# Patient Record
Sex: FEMALE | Race: WHITE | Employment: OTHER | URBAN - METROPOLITAN AREA
[De-identification: names, ages, dates, MRNs, and addresses within clinical notes are randomized per-mention and may not be internally consistent; named-entity substitution may affect disease eponyms.]

---

## 2018-04-04 ENCOUNTER — HOSPITAL ENCOUNTER (OUTPATIENT)
Age: 83
Discharge: HOME | End: 2018-04-06
Payer: COMMERCIAL

## 2018-04-04 ENCOUNTER — OFFICE VISIT (OUTPATIENT)
Dept: FAMILY MEDICINE CLINIC | Age: 83
End: 2018-04-04
Payer: COMMERCIAL

## 2018-04-04 VITALS
DIASTOLIC BLOOD PRESSURE: 82 MMHG | TEMPERATURE: 97.9 F | WEIGHT: 189 LBS | SYSTOLIC BLOOD PRESSURE: 132 MMHG | HEART RATE: 70 BPM | OXYGEN SATURATION: 98 % | BODY MASS INDEX: 33.48 KG/M2 | RESPIRATION RATE: 18 BRPM

## 2018-04-04 DIAGNOSIS — R35.0 URINE FREQUENCY: ICD-10-CM

## 2018-04-04 DIAGNOSIS — N30.00 ACUTE CYSTITIS WITHOUT HEMATURIA: Primary | ICD-10-CM

## 2018-04-04 LAB
BILIRUBIN, POC: NORMAL
BLOOD URINE, POC: NORMAL
CLARITY, POC: NORMAL
COLOR, POC: YELLOW
GLUCOSE URINE, POC: NORMAL
KETONES, POC: NORMAL
LEUKOCYTE EST, POC: NORMAL
NITRITE, POC: NORMAL
PH, POC: 6
PROTEIN, POC: NORMAL
SPECIFIC GRAVITY, POC: 1.02
UROBILINOGEN, POC: NORMAL

## 2018-04-04 PROCEDURE — 81002 URINALYSIS NONAUTO W/O SCOPE: CPT | Performed by: PHYSICIAN ASSISTANT

## 2018-04-04 PROCEDURE — 99213 OFFICE O/P EST LOW 20 MIN: CPT | Performed by: PHYSICIAN ASSISTANT

## 2018-04-04 PROCEDURE — 87088 URINE BACTERIA CULTURE: CPT

## 2018-04-04 PROCEDURE — 87077 CULTURE AEROBIC IDENTIFY: CPT

## 2018-04-04 PROCEDURE — 87186 SC STD MICRODIL/AGAR DIL: CPT

## 2018-04-04 RX ORDER — SULFAMETHOXAZOLE AND TRIMETHOPRIM 800; 160 MG/1; MG/1
1 TABLET ORAL 2 TIMES DAILY
Qty: 20 TABLET | Refills: 0 | Status: SHIPPED | OUTPATIENT
Start: 2018-04-04 | End: 2018-04-14

## 2018-04-07 LAB
ORGANISM: ABNORMAL
URINE CULTURE, ROUTINE: ABNORMAL
URINE CULTURE, ROUTINE: ABNORMAL

## 2018-04-11 DIAGNOSIS — N39.0 CHRONIC UTI: Primary | ICD-10-CM

## 2018-12-12 ENCOUNTER — HOSPITAL ENCOUNTER (EMERGENCY)
Age: 83
Discharge: HOME | End: 2018-12-12
Payer: COMMERCIAL

## 2018-12-12 VITALS
WEIGHT: 172 LBS | SYSTOLIC BLOOD PRESSURE: 129 MMHG | RESPIRATION RATE: 16 BRPM | BODY MASS INDEX: 30.47 KG/M2 | HEART RATE: 82 BPM | TEMPERATURE: 98.6 F | OXYGEN SATURATION: 96 % | DIASTOLIC BLOOD PRESSURE: 76 MMHG

## 2018-12-12 DIAGNOSIS — N39.0 URINARY TRACT INFECTION WITHOUT HEMATURIA, SITE UNSPECIFIED: Primary | ICD-10-CM

## 2018-12-12 LAB
BACTERIA: ABNORMAL /HPF
BILIRUBIN URINE: NEGATIVE
BLOOD, URINE: NEGATIVE
CLARITY: ABNORMAL
COLOR: YELLOW
EPITHELIAL CELLS, UA: ABNORMAL /HPF
GLUCOSE URINE: NEGATIVE MG/DL
KETONES, URINE: ABNORMAL MG/DL
LEUKOCYTE ESTERASE, URINE: ABNORMAL
NITRITE, URINE: NEGATIVE
PH UA: 7 (ref 5–9)
PROTEIN UA: NEGATIVE MG/DL
RBC UA: ABNORMAL /HPF (ref 0–2)
SPECIFIC GRAVITY UA: 1.02 (ref 1–1.03)
UROBILINOGEN, URINE: 0.2 E.U./DL
WBC UA: ABNORMAL /HPF (ref 0–5)

## 2018-12-12 PROCEDURE — 87088 URINE BACTERIA CULTURE: CPT

## 2018-12-12 PROCEDURE — 87186 SC STD MICRODIL/AGAR DIL: CPT

## 2018-12-12 PROCEDURE — 99212 OFFICE O/P EST SF 10 MIN: CPT

## 2018-12-12 PROCEDURE — 87077 CULTURE AEROBIC IDENTIFY: CPT

## 2018-12-12 PROCEDURE — 81001 URINALYSIS AUTO W/SCOPE: CPT

## 2018-12-12 RX ORDER — CEPHALEXIN 500 MG/1
500 CAPSULE ORAL 3 TIMES DAILY
Qty: 21 CAPSULE | Refills: 0 | Status: SHIPPED | OUTPATIENT
Start: 2018-12-12 | End: 2018-12-19

## 2018-12-12 NOTE — ED PROVIDER NOTES
bilaterally, no wheezes, rales, or rhonchi. Not in respiratory distress  Cardiovascular:  Regular rate. Regular rhythm. No murmurs, gallops, or rubs. 2+ distal pulses  Chest: no chest wall tenderness  Abdomen: Soft. Non tender. Non distended. +BS. No rebound, guarding, or rigidity. No pulsatile masses appreciated. Musculoskeletal: Moves all extremities x 4. Warm and well perfused. Capillary refill <3 seconds  Skin: warm and dry. No rashes. Neurologic: GCS 15  Psych: Normal Affect      -------------------------------------------------- RESULTS -------------------------------------------------  I have personally reviewed all laboratory and imaging results for this patient. Results are listed below. LABS:  Results for orders placed or performed during the hospital encounter of 12/12/18   Urinalysis   Result Value Ref Range    Color, UA Yellow Straw/Yellow    Clarity, UA TURBID (A) Clear    Glucose, Ur Negative Negative mg/dL    Bilirubin Urine Negative Negative    Ketones, Urine TRACE (A) Negative mg/dL    Specific Gravity, UA 1.020 1.005 - 1.030    Blood, Urine Negative Negative    pH, UA 7.0 5.0 - 9.0    Protein, UA Negative Negative mg/dL    Urobilinogen, Urine 0.2 <2.0 E.U./dL    Nitrite, Urine Negative Negative    Leukocyte Esterase, Urine MODERATE (A) Negative   Microscopic Urinalysis   Result Value Ref Range    WBC, UA 2-5 0 - 5 /HPF    RBC, UA NONE 0 - 2 /HPF    Epi Cells FEW /HPF    Bacteria, UA MODERATE (A) /HPF       RADIOLOGY:  Interpreted by Radiologist.  No orders to display         ------------------------- NURSING NOTES AND VITALS REVIEWED ---------------------------   The nursing notes within the ED encounter and vital signs as below have been reviewed by myself.   /76   Pulse 82   Temp 98.6 °F (37 °C) (Oral)   Resp 16   Wt 172 lb (78 kg)   SpO2 96%   BMI 30.47 kg/m²   Oxygen Saturation Interpretation: Normal    The patients available past medical records and past encounters were

## 2018-12-14 LAB
ORGANISM: ABNORMAL
URINE CULTURE, ROUTINE: ABNORMAL
URINE CULTURE, ROUTINE: ABNORMAL

## 2019-08-20 ENCOUNTER — APPOINTMENT (OUTPATIENT)
Dept: GENERAL RADIOLOGY | Age: 84
End: 2019-08-20
Payer: MEDICARE

## 2019-08-20 ENCOUNTER — HOSPITAL ENCOUNTER (EMERGENCY)
Age: 84
Discharge: HOME | End: 2019-08-20
Payer: MEDICARE

## 2019-08-20 VITALS
TEMPERATURE: 97.1 F | HEART RATE: 73 BPM | WEIGHT: 160 LBS | SYSTOLIC BLOOD PRESSURE: 146 MMHG | DIASTOLIC BLOOD PRESSURE: 57 MMHG | OXYGEN SATURATION: 95 % | RESPIRATION RATE: 18 BRPM | BODY MASS INDEX: 28.34 KG/M2

## 2019-08-20 DIAGNOSIS — R07.81 RIB PAIN ON LEFT SIDE: Primary | ICD-10-CM

## 2019-08-20 DIAGNOSIS — S20.212A CONTUSION OF RIB ON LEFT SIDE, INITIAL ENCOUNTER: ICD-10-CM

## 2019-08-20 PROCEDURE — 71101 X-RAY EXAM UNILAT RIBS/CHEST: CPT

## 2019-08-20 PROCEDURE — 99212 OFFICE O/P EST SF 10 MIN: CPT

## 2019-08-20 ASSESSMENT — PAIN DESCRIPTION - PAIN TYPE: TYPE: ACUTE PAIN

## 2019-08-20 ASSESSMENT — PAIN SCALES - GENERAL: PAINLEVEL_OUTOF10: 8

## 2019-08-20 ASSESSMENT — PAIN DESCRIPTION - DESCRIPTORS: DESCRIPTORS: SHARP

## 2019-08-20 ASSESSMENT — PAIN DESCRIPTION - LOCATION: LOCATION: RIB CAGE

## 2019-08-20 ASSESSMENT — PAIN DESCRIPTION - ORIENTATION: ORIENTATION: LEFT

## 2019-08-20 NOTE — ED PROVIDER NOTES
---------------------------------    IMPRESSION  1. Rib pain on left side    2. Contusion of rib on left side, initial encounter        DISPOSITION  Disposition: Discharge to home  Patient condition is good      NOTE: This report was transcribed using voice recognition software.  Every effort was made to ensure accuracy; however, inadvertent computerized transcription errors may be present         Mike Martin PA-C  08/20/19 0428

## 2021-08-19 ENCOUNTER — APPOINTMENT (OUTPATIENT)
Dept: CT IMAGING | Age: 86
End: 2021-08-19
Payer: MEDICARE

## 2021-08-19 ENCOUNTER — APPOINTMENT (OUTPATIENT)
Dept: GENERAL RADIOLOGY | Age: 86
DRG: 084 | End: 2021-08-19
Payer: MEDICARE

## 2021-08-19 ENCOUNTER — HOSPITAL ENCOUNTER (INPATIENT)
Age: 86
LOS: 1 days | Discharge: HOME | DRG: 084 | End: 2021-08-20
Attending: EMERGENCY MEDICINE
Payer: MEDICARE

## 2021-08-19 ENCOUNTER — APPOINTMENT (OUTPATIENT)
Dept: CT IMAGING | Age: 86
DRG: 084 | End: 2021-08-19
Payer: MEDICARE

## 2021-08-19 ENCOUNTER — HOSPITAL ENCOUNTER (EMERGENCY)
Age: 86
Discharge: ANOTHER HOSPITAL | End: 2021-08-19
Attending: EMERGENCY MEDICINE
Payer: MEDICARE

## 2021-08-19 VITALS
OXYGEN SATURATION: 97 % | BODY MASS INDEX: 27.46 KG/M2 | WEIGHT: 155 LBS | TEMPERATURE: 97.6 F | SYSTOLIC BLOOD PRESSURE: 113 MMHG | RESPIRATION RATE: 12 BRPM | HEART RATE: 53 BPM | DIASTOLIC BLOOD PRESSURE: 96 MMHG

## 2021-08-19 DIAGNOSIS — S06.5XAA SUBDURAL HEMATOMA: ICD-10-CM

## 2021-08-19 DIAGNOSIS — S09.90XA INJURY OF HEAD, INITIAL ENCOUNTER: Primary | ICD-10-CM

## 2021-08-19 DIAGNOSIS — I60.9 SAH (SUBARACHNOID HEMORRHAGE) (HCC): ICD-10-CM
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
## 2021-08-19 DIAGNOSIS — S00.03XA HEMATOMA OF SCALP, INITIAL ENCOUNTER: ICD-10-CM

## 2021-08-19 DIAGNOSIS — S06.6X9A SUBARACHNOID HEMATOMA WITH LOSS OF CONSCIOUSNESS, INITIAL ENCOUNTER (HCC): ICD-10-CM

## 2021-08-19 DIAGNOSIS — S06.5XAA SDH (SUBDURAL HEMATOMA): ICD-10-CM
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
## 2021-08-19 LAB
ANION GAP SERPL CALCULATED.3IONS-SCNC: 9 MMOL/L (ref 7–16)
BASOPHILS ABSOLUTE: 0.04 E9/L (ref 0–0.2)
BASOPHILS RELATIVE PERCENT: 0.6 % (ref 0–2)
BUN BLDV-MCNC: 19 MG/DL (ref 6–23)
CALCIUM SERPL-MCNC: 9 MG/DL (ref 8.6–10.2)
CHLORIDE BLD-SCNC: 109 MMOL/L (ref 98–107)
CO2: 24 MMOL/L (ref 22–29)
CREAT SERPL-MCNC: 1.1 MG/DL (ref 0.5–1)
EOSINOPHILS ABSOLUTE: 0.11 E9/L (ref 0.05–0.5)
EOSINOPHILS RELATIVE PERCENT: 1.8 % (ref 0–6)
GFR AFRICAN AMERICAN: 57
GFR NON-AFRICAN AMERICAN: 47 ML/MIN/1.73
GLUCOSE BLD-MCNC: 125 MG/DL (ref 74–99)
HCT VFR BLD CALC: 37.7 % (ref 34–48)
HEMOGLOBIN: 11.9 G/DL (ref 11.5–15.5)
IMMATURE GRANULOCYTES #: 0.04 E9/L
IMMATURE GRANULOCYTES %: 0.6 % (ref 0–5)
LYMPHOCYTES ABSOLUTE: 1.16 E9/L (ref 1.5–4)
LYMPHOCYTES RELATIVE PERCENT: 18.8 % (ref 20–42)
MCH RBC QN AUTO: 29.2 PG (ref 26–35)
MCHC RBC AUTO-ENTMCNC: 31.6 % (ref 32–34.5)
MCV RBC AUTO: 92.6 FL (ref 80–99.9)
MONOCYTES ABSOLUTE: 0.4 E9/L (ref 0.1–0.95)
MONOCYTES RELATIVE PERCENT: 6.5 % (ref 2–12)
NEUTROPHILS ABSOLUTE: 4.41 E9/L (ref 1.8–7.3)
NEUTROPHILS RELATIVE PERCENT: 71.7 % (ref 43–80)
PDW BLD-RTO: 14.6 FL (ref 11.5–15)
PLATELET # BLD: 232 E9/L (ref 130–450)
PMV BLD AUTO: 10.4 FL (ref 7–12)
POTASSIUM REFLEX MAGNESIUM: 4.3 MMOL/L (ref 3.5–5)
RBC # BLD: 4.07 E12/L (ref 3.5–5.5)
SODIUM BLD-SCNC: 142 MMOL/L (ref 132–146)
TROPONIN, HIGH SENSITIVITY: 18 NG/L (ref 0–9)
TROPONIN, HIGH SENSITIVITY: 18 NG/L (ref 0–9)
WBC # BLD: 6.2 E9/L (ref 4.5–11.5)

## 2021-08-19 PROCEDURE — 84484 ASSAY OF TROPONIN QUANT: CPT

## 2021-08-19 PROCEDURE — 99284 EMERGENCY DEPT VISIT MOD MDM: CPT

## 2021-08-19 PROCEDURE — 6360000002 HC RX W HCPCS: Performed by: STUDENT IN AN ORGANIZED HEALTH CARE EDUCATION/TRAINING PROGRAM

## 2021-08-19 PROCEDURE — 72125 CT NECK SPINE W/O DYE: CPT

## 2021-08-19 PROCEDURE — 80048 BASIC METABOLIC PNL TOTAL CA: CPT

## 2021-08-19 PROCEDURE — 70450 CT HEAD/BRAIN W/O DYE: CPT

## 2021-08-19 PROCEDURE — 2580000003 HC RX 258: Performed by: STUDENT IN AN ORGANIZED HEALTH CARE EDUCATION/TRAINING PROGRAM

## 2021-08-19 PROCEDURE — 6360000002 HC RX W HCPCS

## 2021-08-19 PROCEDURE — 1200000000 HC SEMI PRIVATE

## 2021-08-19 PROCEDURE — 72170 X-RAY EXAM OF PELVIS: CPT

## 2021-08-19 PROCEDURE — 93005 ELECTROCARDIOGRAM TRACING: CPT | Performed by: STUDENT IN AN ORGANIZED HEALTH CARE EDUCATION/TRAINING PROGRAM

## 2021-08-19 PROCEDURE — 96374 THER/PROPH/DIAG INJ IV PUSH: CPT

## 2021-08-19 PROCEDURE — 99285 EMERGENCY DEPT VISIT HI MDM: CPT | Performed by: SURGERY

## 2021-08-19 PROCEDURE — 85025 COMPLETE CBC W/AUTO DIFF WBC: CPT

## 2021-08-19 PROCEDURE — 71045 X-RAY EXAM CHEST 1 VIEW: CPT

## 2021-08-19 PROCEDURE — 99285 EMERGENCY DEPT VISIT HI MDM: CPT

## 2021-08-19 PROCEDURE — 96365 THER/PROPH/DIAG IV INF INIT: CPT

## 2021-08-19 RX ORDER — FENTANYL CITRATE 50 UG/ML
25 INJECTION, SOLUTION INTRAMUSCULAR; INTRAVENOUS ONCE
Status: COMPLETED | OUTPATIENT
Start: 2021-08-19 | End: 2021-08-19

## 2021-08-19 RX ORDER — SODIUM CHLORIDE 9 MG/ML
25 INJECTION, SOLUTION INTRAVENOUS PRN
Status: DISCONTINUED | OUTPATIENT
Start: 2021-08-19 | End: 2021-08-20 | Stop reason: HOSPADM

## 2021-08-19 RX ORDER — POLYETHYLENE GLYCOL 3350 17 G/17G
17 POWDER, FOR SOLUTION ORAL DAILY
Status: DISCONTINUED | OUTPATIENT
Start: 2021-08-19 | End: 2021-08-20 | Stop reason: HOSPADM

## 2021-08-19 RX ORDER — SODIUM CHLORIDE 9 MG/ML
INJECTION, SOLUTION INTRAVENOUS CONTINUOUS
Status: DISCONTINUED | OUTPATIENT
Start: 2021-08-19 | End: 2021-08-20

## 2021-08-19 RX ORDER — 0.9 % SODIUM CHLORIDE 0.9 %
500 INTRAVENOUS SOLUTION INTRAVENOUS ONCE
Status: COMPLETED | OUTPATIENT
Start: 2021-08-19 | End: 2021-08-19

## 2021-08-19 RX ORDER — SODIUM CHLORIDE 0.9 % (FLUSH) 0.9 %
10 SYRINGE (ML) INJECTION EVERY 12 HOURS SCHEDULED
Status: DISCONTINUED | OUTPATIENT
Start: 2021-08-19 | End: 2021-08-20 | Stop reason: HOSPADM

## 2021-08-19 RX ORDER — ONDANSETRON 4 MG/1
4 TABLET, ORALLY DISINTEGRATING ORAL EVERY 8 HOURS PRN
Status: DISCONTINUED | OUTPATIENT
Start: 2021-08-19 | End: 2021-08-20 | Stop reason: HOSPADM

## 2021-08-19 RX ORDER — ONDANSETRON 2 MG/ML
4 INJECTION INTRAMUSCULAR; INTRAVENOUS EVERY 6 HOURS PRN
Status: DISCONTINUED | OUTPATIENT
Start: 2021-08-19 | End: 2021-08-20 | Stop reason: HOSPADM

## 2021-08-19 RX ORDER — LEVETIRACETAM 5 MG/ML
500 INJECTION INTRAVASCULAR 2 TIMES DAILY
Status: DISCONTINUED | OUTPATIENT
Start: 2021-08-19 | End: 2021-08-20 | Stop reason: HOSPADM

## 2021-08-19 RX ORDER — SODIUM CHLORIDE 0.9 % (FLUSH) 0.9 %
10 SYRINGE (ML) INJECTION PRN
Status: DISCONTINUED | OUTPATIENT
Start: 2021-08-19 | End: 2021-08-20 | Stop reason: HOSPADM

## 2021-08-19 RX ADMIN — SODIUM CHLORIDE 500 ML: 9 INJECTION, SOLUTION INTRAVENOUS at 12:42

## 2021-08-19 RX ADMIN — FENTANYL CITRATE 25 MCG: 50 INJECTION, SOLUTION INTRAMUSCULAR; INTRAVENOUS at 13:51

## 2021-08-19 RX ADMIN — LEVETIRACETAM 500 MG: 5 INJECTION, SOLUTION INTRAVENOUS at 21:00

## 2021-08-19 ASSESSMENT — PAIN SCALES - GENERAL
PAINLEVEL_OUTOF10: 4
PAINLEVEL_OUTOF10: 7

## 2021-08-19 ASSESSMENT — PAIN DESCRIPTION - PAIN TYPE: TYPE: ACUTE PAIN

## 2021-08-19 ASSESSMENT — PAIN DESCRIPTION - DESCRIPTORS: DESCRIPTORS: PRESSURE;CONSTANT;DISCOMFORT

## 2021-08-19 ASSESSMENT — PAIN DESCRIPTION - LOCATION: LOCATION: HEAD

## 2021-08-19 NOTE — ED NOTES
Bed: 02  Expected date:   Expected time:   Means of arrival:   Comments:     Dorene Rivera RN  08/19/21 2563

## 2021-08-19 NOTE — ED PROVIDER NOTES
Department of Emergency Medicine   ED  Provider Note  Admit Date/RoomTime: 8/19/2021 11:56 AM  ED Room: 02/02          History of Present Illness:  8/19/21, Time: 12:19 PM EDT  Chief Complaint   Patient presents with   [address] while checking out at the dr office, states she felt light headed and passed out. Hit back of head on the floor, witnesses stated to ems that she  passed out for about 15 seconds. Pt states she does take eliquis, c/o head pain                 Kolby Carrillo is a 80 y.o. female presenting to the ED for evaluation after fall at home earlier today. Patient states that she was at home earlier today after she stood up she got lightheaded, passed out and fell and hit her head. She states this happened from the past she denies injury to any of her extremities. Only complaint is currently a large bruise on the back of her head with associated headache. She denies visual disturbances, nausea, vomiting, chest pain, shortness of breath. Review of Systems:   Review of Systems   Constitutional: Negative for diaphoresis and fatigue. HENT: Negative for congestion and rhinorrhea. Eyes: Negative for photophobia and visual disturbance. Respiratory: Negative for shortness of breath. Cardiovascular: Negative for chest pain, palpitations and leg swelling. Gastrointestinal: Negative for nausea and vomiting. Genitourinary: Negative for decreased urine volume and difficulty urinating. Musculoskeletal: Negative for back pain, gait problem and neck pain. Skin: Positive for wound. Negative for pallor. Neurological: Positive for dizziness, syncope and light-headedness. Negative for headaches. Hematological: Bruises/bleeds easily.    Psychiatric/Behavioral: Negative for agitation and confusion.             --------------------------------------------- PAST HISTORY ---------------------------------------------  Past Medical History:  has a past medical history of Atrial fibrillation ([facility] [address] Compression fracture of L2 ([facility] [address] Hyperlipidemia, Hypertension, and Thyroid disease. Past Surgical History:  has a past surgical history that includes back surgery; Appendectomy; Hysterectomy; Cholecystectomy; Tonsillectomy and adenoidectomy; and Breast surgery. Social History:  reports that she has never smoked. She has never used smokeless tobacco.    Family History: family history is not on file. . Unless otherwise noted, family history is non contributory    The patients home medications have been reviewed. Allergies: Macrodantin [nitrofurantoin] and Codeine    I have reviewed the past medical history, past surgical history, social history, and family history    ---------------------------------------------------PHYSICAL EXAM--------------------------------------    Constitutional/General: Alert and oriented x3  Head: Normocephalic and a large hematoma on the back of her head. Approximately 5 x 5 cm  Eyes: PERRL, EOMI, sclera non icteric  Mouth: Oropharynx clear, handling secretions, no trismus, no asymmetry of the posterior oropharynx or uvular edema  Neck: Supple, full ROM, no stridor, no meningeal signs  Respiratory: Lungs clear to auscultation bilaterally, no wheezes, rales, or rhonchi. Not in respiratory distress  Cardiovascular:  Regular rate. Regular rhythm. No murmurs, no aortic murmurs, no gallops, or rubs. 2+ distal pulses. Equal extremity pulses. Chest: No chest wall tenderness  Gastrointestinal:  Abdomen Soft, Non tender, Non distended. No rebound, guarding, or rigidity. No pulsatile masses. Musculoskeletal: Moves all extremities x 4. Warm and well perfused, no clubbing, no cyanosis, no edema. Capillary refill <3 seconds  Skin: skin warm and dry. No rashes.    Neurologic: GCS 15, no focal deficits, symmetric strength 5/5 in the upper and lower extremities bilaterally  Psychiatric: Normal Affect          -------------------------------------------------- RESULTS -------------------------------------------------  I have personally reviewed all laboratory and imaging results for this patient. Results are listed below. LABS: (Lab results interpreted by me)  Results for orders placed or performed during the hospital encounter of 08/19/21   CBC Auto Differential   Result Value Ref Range    WBC 6.2 4.5 - 11.5 E9/L    RBC 4.07 3.50 - 5.50 E12/L    Hemoglobin 11.9 11.5 - 15.5 g/dL    Hematocrit 37.7 34.0 - 48.0 %    MCV 92.6 80.0 - 99.9 fL    MCH 29.2 26.0 - 35.0 pg    MCHC 31.6 (L) 32.0 - 34.5 %    RDW 14.6 11.5 - 15.0 fL    Platelets 888 212 - 339 E9/L    MPV 10.4 7.0 - 12.0 fL    Neutrophils % 71.7 43.0 - 80.0 %    Immature Granulocytes % 0.6 0.0 - 5.0 %    Lymphocytes % 18.8 (L) 20.0 - 42.0 %    Monocytes % 6.5 2.0 - 12.0 %    Eosinophils % 1.8 0.0 - 6.0 %    Basophils % 0.6 0.0 - 2.0 %    Neutrophils Absolute 4.41 1.80 - 7.30 E9/L    Immature Granulocytes # 0.04 E9/L    Lymphocytes Absolute 1.16 (L) 1.50 - 4.00 E9/L    Monocytes Absolute 0.40 0.10 - 0.95 E9/L    Eosinophils Absolute 0.11 0.05 - 0.50 E9/L    Basophils Absolute 0.04 0.00 - 0.20 P7/G   Basic Metabolic Panel w/ Reflex to MG   Result Value Ref Range    Sodium 142 132 - 146 mmol/L    Potassium reflex Magnesium 4.3 3.5 - 5.0 mmol/L    Chloride 109 (H) 98 - 107 mmol/L    CO2 24 22 - 29 mmol/L    Anion Gap 9 7 - 16 mmol/L    Glucose 125 (H) 74 - 99 mg/dL    BUN 19 6 - 23 mg/dL    CREATININE 1.1 (H) 0.5 - 1.0 mg/dL    GFR Non-African American 47 >=60 mL/min/1.73    GFR African American 57     Calcium 9.0 8.6 - 10.2 mg/dL   Troponin   Result Value Ref Range    Troponin, High Sensitivity 18 (H) 0 - 9 ng/L   ,       RADIOLOGY:  Interpreted by Radiologist unless otherwise specified  CT Head WO Contrast   Final Result   Small 2 mm in thickness subdural hematoma along the midline falx and small   subarachnoid hemorrhage in the left frontal area. Diffuse age-appropriate atrophy and small-vessel ischemic disease. Large scalp hematoma in the posterior parietal region. Critical results were called by Dr. Mirian Plascencia to Dr. Adi Wells. On 2021 at 13:17. CT Cervical Spine WO Contrast   Final Result   No acute compression deformities. Multilevel degenerative changes as described above. Focal 2.4 cm right thyroid nodule identified. Further evaluation with   outpatient thyroid ultrasound recommended if not previously obtained. EKG Interpretation  Interpreted by Adi Wells DO    Date of EK21  Time: 1241    Rhythm: sinus bradycardia and 1 degree AV block  Rate: bradycardia  Axis: left  Conduction: normal  ST Segments: no acute change  T Waves: no acute change    Clinical Impression: Bradycardia first-degree AV block, less left axis deviation, moderate voltage criteria for LVH met. Comparison to prior EKG: no previous EKG      ------------------------- NURSING NOTES AND VITALS REVIEWED ---------------------------   The nursing notes within the ED encounter and vital signs as below have been reviewed by myself  BP (!) 176/75   Pulse 55   Temp 97.6 °F (36.4 °C) (Oral)   Resp 16   Wt 155 lb (70.3 kg)   SpO2 98%   BMI 27.46 kg/m²     Oxygen Saturation Interpretation: 95% on room air. Normal    The patients available past medical records and past encounters were reviewed. ------------------------------ ED COURSE/MEDICAL DECISION MAKING----------------------  Medications   0.9 % sodium chloride bolus (0 mLs Intravenous Stopped 21 1354)   fentaNYL (SUBLIMAZE) injection 25 mcg (25 mcg Intravenous Given 21 1351)               Medical Decision Making:     The patient was seen and evaluated by the Attending Emergency Medicine Physician Dr. Kriste Najjar      Patient was evaluated for head injury after a syncopal fall at home earlier today. CBC, MP, troponin, EKG, CT head without contrast, and CT cervical spine without contrast were ordered.   Laboratory evaluation demonstrated elevated troponin of 18. Repeat ordered. No other acute abnormalities. Imaging showed a small 2 mm subarachnoid hemorrhage in the left falx and a small subdural hematoma in the left frontal lobe. I spoke with the radiologist on the phone. CT of the cervical spine also demonstrated a small thyroid nodule. I made the patient aware of this incidental finding and told her to follow-up with her primary care doctor when she is discharged from the hospital.    At this time the patient needed a higher level of care, trauma consult potential neurosurgical consult. A transfer to [facility] was arranged. Spoke with Dr. Radha Jensen the ER doc at [facility]. He was accepting of the transfer. Re-Evaluations:       Patient remained stable on reevaluation and was was agreeable to being transferred to [facility]. This patient's ED course included: a personal history and physicial examination    This patient has remained hemodynamically stable during their ED course. Counseling: The emergency provider has spoken with the patient and family member patient and son and discussed todays results, in addition to providing specific details for the plan of care and counseling regarding the diagnosis and prognosis. Questions are answered at this time and they are agreeable with the plan.       --------------------------------- IMPRESSION AND DISPOSITION ---------------------------------    IMPRESSION  1. Injury of head, initial encounter    2. Subarachnoid hematoma with loss of consciousness, initial encounter ([facility] [address]    3. Hematoma of scalp, initial encounter    4. Subdural hematoma ([facility] [address]        DISPOSITION  Disposition: Transfer to [facility]  Patient condition is stable        NOTE: This report was transcribed using voice recognition software.  Every effort was made to ensure accuracy; however, inadvertent computerized transcription errors may be present     ATTENDING PROVIDER ATTESTATION:     Brenda Mock presented to the emergency department for evaluation of Fall Pierce White while checking out at the dr office, states she felt light headed and passed out. Hit back of head on the floor, witnesses stated to ems that she  passed out for about 15 seconds. Pt states she does take eliquis, c/o head pain )    I have reviewed and discussed the case, including pertinent history (medical, surgical, family and social) and exam findings with the Resident and the Nurse assigned to Brenda Mock. I have personally performed and/or participated in the history, exam, medical decision making, and procedures and agree with all pertinent clinical information. Patient presented to the ED for evaluation after falling at home. On exam she has no tenderness of the cervical, thoracic, lumbar spine. She does have a cephalhematoma on the posterior occiput. There is a small laceration. No significant hemorrhage present. No bony crepitance appreciated. Sensation grossly intact. No focal neurological deficits. No ataxia with finger-to-nose. GCS is 15 upon arrival.  No gross deformity of the upper or lower extremities      I have reviewed my findings and recommendations with Brenda Mock and members of family present at the time of disposition. Critical care:  Please note that the withdrawal or failure to initiate urgent interventions for this patient would likely result in a life threatening deterioration or permanent disability. Systems at risk for deterioration include: Subdural hematoma/subarachnoid hemorrhage requiring transfer to tertiary care center for further treatment evaluation and evaluation by neurosurgery    Accordingly this patient received 31 minutes of critical care time, excluding separately billable procedures.        MDM: Supportive care, will obtain appropriate labs and imaging to assess patient's Fall Vitaly Cortez while checking out at the dr office, states she felt light headed and passed out. Hit back of head on the floor, witnesses stated to ems that she  passed out for about 15 seconds. Pt states she does take eliquis, c/o head pain )       My findings/plan: The primary encounter diagnosis was Injury of head, initial encounter. Diagnoses of Subarachnoid hematoma with loss of consciousness, initial encounter ([facility] [address] Hematoma of scalp, initial encounter, and Subdural hematoma (H[facility]) were also pertinent to this visit.   Discharge Medication List as of 8/19/2021  3:04 PM        Tyesha Erm, [address], DO  Resident  08/19/21 [address], DO  08/20/21 [id number]

## 2021-08-19 NOTE — H&P
TRAUMA HISTORY & PHYSICAL  Surgical Resident/Advance Practice Nurse  8/19/2021  3:51 PM    PRIMARY SURVEY    CHIEF COMPLAINT:  Trauma consult. Injury occurred just prior to arrival     79 y/o female with history of A-fib presents to ED for syncopal fall that occurred earlier today. Patient is a transfer from [facility]. She states she was at home stood up, got lightheaded and and passed out. She says she has been feeling more dizzy lately. Has seen EP in [facility] but has not seen them in a while. Denies LOC.      AIRWAY:   Airway Normal  EMS ETT Absent  Noisy respirations Absent  Retractions: Absent  Vomiting/bleeding: Absent      BREATHING:    Midaxillary breath sound left:  Normal  Midaxillary breath sound right:  Normal    Cough sound intensity:  fair   FiO2: 15 liters/min via non-rebreather face mask       CIRCULATION:   Femerol pulse intensity: Strong  Palpebral conjunctiva: Pink       Vitals:    08/19/21 1524   BP: (!) 142/97   Pulse: 55   Resp: 18   Temp: 96.2 °F (35.7 °C)   SpO2: 98%       Vitals:    08/19/21 1524   BP: (!) 142/97   Pulse: 55   Resp: 18   Temp: 96.2 °F (35.7 °C)   SpO2: 98%   Weight: 155 lb (70.3 kg)   Height: 5' 3\" (1.6 m)        FAST EXAM: deferred    Central Nervous System    GCS Initial 15 minutes   Eye  Motor  Verbal 4 - Opens eyes on own  6 - Follows simple motor commands  5 - Alert and oriented 4 - Opens eyes on own  6 - Follows simple motor commands  5 - Alert and oriented     Neuromuscular blockade: No  Pupil size:  Left 3 mm    Right 3 mm  Pupil reaction: Yes    Wiggles fingers: Left Yes Right Yes  Wiggles toes: Left Yes   Right yes    Hand grasp:   Left  Present      Right  Present  Plantar flexion: Left  Present      Right   Present    Loss of consciousness:  Yes  History Obtained From:  Patient & EMS  Private Medical Doctor: Unknown    Pre-exisiting Medical History:  yes    Conditions: A-fib     Medications: Eliquis    Allergies: Macordantin, codeine    Social History: Tobacco use:  none  Alcohol use:  none  Illicit drug use:  no history of illicit drug use    Past Surgical History:  Appe, hysterectomy    Anticoagulant use:  No   Antiplatelet use:    Eliquis    NSAID use in last 72 hours: unknown  Taken PCN in past:  unknown  Last food/drink: earlier today  Last tetanus: UTD    Family History:   Not pertinent to presenting problem. Complaints:   Head:  None  Neck:   None  Chest:   None  Back:   None  Abdomen:   None  Extremities:   None  Comments:     Review of systems:  All negative unless otherwise noted. SECONDARY SURVEY  Head/scalp: Atraumatic    Face: Atraumatic    Eyes/ears/nose: Atraumatic    Pharynx/mouth: Atraumatic    Neck: Atraumatic     Cervical spine tenderness:   Cervical collar in place at time of arrival  Pain:  none  ROM:  Not indicated     Chest wall:  Atraumatic    Heart:  Regular rate & rhythm    Abdomen: Atraumatic. Soft ND  Tenderness:  none    Pelvis: Atraumatic  Tenderness: none    Thoracolumbar spine: Atraumatic  Tenderness:  none    Genitourinary:  Atraumatic. No blood or urine noted    Rectum: Atraumatic. No blood noted. Perineum: Atraumatic. No blood or urine noted. Extremities:   Sensory normal  Motor normal    Distal Pulses  Left arm normal  Right arm normal  Left leg normal  Right leg normal    Capillary refill  Left arm normal  Right arm normal  Left leg normal  Right leg normal    Procedures in ED:  none    In the event of Emergency Blood Transfusion:  Due to the critical condition of this patient, I request the immediate release of blood products for emergency transfusion secondary to shock. I understand the increased risks incurred by the lack of complete transfusion testing.       Radiology: Chest Xray , Pelvic Xray , CT Head  and CT Cervical spine     Consultations:  Neurosurgery    Admission/Diagnosis: [facility]    Plan of Treatment: Sunil Argueta discussed with Dr. Tomas Galindo at 8/19/2021 on 3:51 PM    Electronically signed by [employer], DO on 8/19/2021 at 3:51 PM

## 2021-08-19 NOTE — ED NOTES
Bed: 08  Expected date: 8/19/21  Expected time:   Means of arrival:   Comments:  [id number] Cali Rosario, RN  08/19/21 2493

## 2021-08-19 NOTE — Clinical Note
Patient Class: Inpatient [101]   REQUIRED: Diagnosis: Fall at home, initial encounter [[id number]]   Estimated Length of Stay: Estimated stay of more than 2 midnights

## 2021-08-19 NOTE — ED PROVIDER NOTES
EXAM--------------------------------------    Constitutional/General: Alert and oriented x3  Head: Normocephalic, large cephalohematoma to the occipital scalp  Eyes: PERRL, EOMI, sclera non icteric  Mouth: Oropharynx clear, handling secretions, no trismus, no asymmetry of the posterior oropharynx or uvular edema  Neck: Supple, full ROM, no stridor, no meningeal signs  Respiratory: Lungs clear to auscultation bilaterally, no wheezes, rales, or rhonchi. Not in respiratory distress  Cardiovascular:  Regular rate. Regular rhythm. No murmurs, no aortic murmurs, no gallops, or rubs. 2+ distal pulses. Equal extremity pulses. Chest: No chest wall tenderness  GI:  Abdomen Soft, Non tender, Non distended. No rebound, guarding, or rigidity. No pulsatile masses. Musculoskeletal: Moves all extremities x 4. Warm and well perfused, no clubbing, cyanosis, or edema. Capillary refill <3 seconds  Integument: skin warm and dry. No rashes. Neurologic: GCS 15, no focal deficits, symmetric strength 5/5 in the upper and lower extremities bilaterally  Psychiatric: Normal Affect          -------------------------------------------------- RESULTS -------------------------------------------------  I have personally reviewed all laboratory and imaging results for this patient. Results are listed below. LABS: (Lab results interpreted by me)  No results found for this visit on 08/19/21.,       RADIOLOGY:  Interpreted by Radiologist unless otherwise specified  XR CHEST PORTABLE   Final Result   No pneumonia or pleural effusion. XR PELVIS (1-2 VIEWS)   Final Result   No evidence of pelvic fracture or hip fracture. CT HEAD WO CONTRAST   Final Result   1. Minimal subdural blood along the interhemispheric falx is stable. 2. Minimal posttraumatic subarachnoid blood in the high left frontal lobe is   slightly less prominent. 3. Stable posterior scalp hematoma. 4. No mass effect or midline shift. ------------------------- NURSING NOTES AND VITALS REVIEWED ---------------------------   The nursing notes within the ED encounter and vital signs as below have been reviewed by myself  BP (!) 144/72   Pulse 61   Temp 97.1 °F (36.2 °C)   Resp 15   Ht 5' 3\" (1.6 m)   Wt 155 lb (70.3 kg)   SpO2 97%   BMI 27.46 kg/m²     Oxygen Saturation Interpretation: Normal    The patients available past medical records and past encounters were reviewed. ------------------------------ ED COURSE/MEDICAL DECISION MAKING----------------------  Medications   levETIRAcetam (KEPPRA) 500 mg/100 mL IVPB (0 mg Intravenous Stopped 8/19/21 2226)   sodium chloride flush 0.9 % injection 10 mL (has no administration in time range)   sodium chloride flush 0.9 % injection 10 mL (has no administration in time range)   0.9 % sodium chloride infusion (has no administration in time range)   ondansetron (ZOFRAN-ODT) disintegrating tablet 4 mg (has no administration in time range)     Or   ondansetron (ZOFRAN) injection 4 mg (has no administration in time range)   polyethylene glycol (GLYCOLAX) packet 17 g (has no administration in time range)   bisacodyl (DULCOLAX) EC tablet 5 mg (has no administration in time range)   perflutren lipid microspheres (DEFINITY) injection 1.65 mg (has no administration in time range)   acetaminophen (TYLENOL) tablet 650 mg (has no administration in time range)           The cardiac monitor revealed sinus rhythm with a heart rate in the 60s as interpreted by me. The cardiac monitor was ordered secondary to the patient's chest pain and to monitor for patient for dysrhythmia. CPT 66542           Medical Decision Making:     I, Dr. Juan Pablo Hampton, am the primary provider of record    80-year-old female presenting after a mechanical fall, diagnosed at outside hospital with small subdural and subarachnoid hemorrhage. She is neurologically intact and hemodynamically stable.   With the size of the bleeding and her neurologic exam, no reversal of Eliquis at this time. Patient was evaluated by trauma services, will be admitted to their service. She rested comfortably remained hemodynamically stable during her ED course. Re-Evaluations: This patient's ED course included: a personal history and physicial examination, re-evaluation prior to disposition, cardiac monitoring and continuous pulse oximetry    This patient has remained hemodynamically stable and been closely monitored during their ED course. Consultations:  Trauma services    Admit to their service        Counseling: The emergency provider has spoken with the patient and discussed todays results, in addition to providing specific details for the plan of care and counseling regarding the diagnosis and prognosis. Questions are answered at this time and they are agreeable with the plan.       --------------------------------- IMPRESSION AND DISPOSITION ---------------------------------    IMPRESSION  1. Fall, initial encounter    2. SDH (subdural hematoma) ([facility])    3. [facility]noid hemorrhage) [facility]        DISPOSITION  Disposition: Admit to telemetry  Patient condition is stable        NOTE: This report was transcribed using voice recognition software.  Every effort was made to ensure accuracy; however, inadvertent computerized transcription errors may be present        Nancy Kaiser DO  08/20/21 7303

## 2021-08-20 VITALS
HEIGHT: 63 IN | BODY MASS INDEX: 27.46 KG/M2 | OXYGEN SATURATION: 97 % | TEMPERATURE: 97.1 F | DIASTOLIC BLOOD PRESSURE: 57 MMHG | WEIGHT: 155 LBS | RESPIRATION RATE: 18 BRPM | SYSTOLIC BLOOD PRESSURE: 122 MMHG | HEART RATE: 48 BPM

## 2021-08-20 LAB
ANGLE (CLOT STRENGTH): 75.8 DEGREE (ref 59–74)
ANION GAP SERPL CALCULATED.3IONS-SCNC: 5 MMOL/L (ref 7–16)
BASOPHILS ABSOLUTE: 0.03 E9/L (ref 0–0.2)
BASOPHILS RELATIVE PERCENT: 0.5 % (ref 0–2)
BUN BLDV-MCNC: 15 MG/DL (ref 6–23)
CALCIUM SERPL-MCNC: 9.2 MG/DL (ref 8.6–10.2)
CHLORIDE BLD-SCNC: 105 MMOL/L (ref 98–107)
CO2: 27 MMOL/L (ref 22–29)
CREAT SERPL-MCNC: 1.1 MG/DL (ref 0.5–1)
EKG ATRIAL RATE: 53 BPM
EKG P AXIS: 3 DEGREES
EKG P-R INTERVAL: 210 MS
EKG Q-T INTERVAL: 494 MS
EKG QRS DURATION: 104 MS
EKG QTC CALCULATION (BAZETT): 463 MS
EKG R AXIS: -43 DEGREES
EKG T AXIS: 16 DEGREES
EKG VENTRICULAR RATE: 53 BPM
EOSINOPHILS ABSOLUTE: 0.16 E9/L (ref 0.05–0.5)
EOSINOPHILS RELATIVE PERCENT: 2.5 % (ref 0–6)
EPL-TEG: 0.7 % (ref 0–15)
G-TEG: 11.6 K D/SC (ref 4.5–11)
GFR AFRICAN AMERICAN: 57
GFR NON-AFRICAN AMERICAN: 47 ML/MIN/1.73
GLUCOSE BLD-MCNC: 104 MG/DL (ref 74–99)
HCT VFR BLD CALC: 33.5 % (ref 34–48)
HEMOGLOBIN: 10.6 G/DL (ref 11.5–15.5)
IMMATURE GRANULOCYTES #: 0.02 E9/L
IMMATURE GRANULOCYTES %: 0.3 % (ref 0–5)
K (CLOTTING TIME): 0.9 MIN (ref 1–3)
LY30 (FIBRINOLYSIS): 0.7 % (ref 0–8)
LYMPHOCYTES ABSOLUTE: 1.26 E9/L (ref 1.5–4)
LYMPHOCYTES RELATIVE PERCENT: 20.1 % (ref 20–42)
MA (MAX AMPLITUDE): 69.9 MM (ref 50–70)
MCH RBC QN AUTO: 28.4 PG (ref 26–35)
MCHC RBC AUTO-ENTMCNC: 31.6 % (ref 32–34.5)
MCV RBC AUTO: 89.8 FL (ref 80–99.9)
MONOCYTES ABSOLUTE: 0.51 E9/L (ref 0.1–0.95)
MONOCYTES RELATIVE PERCENT: 8.1 % (ref 2–12)
NEUTROPHILS ABSOLUTE: 4.3 E9/L (ref 1.8–7.3)
NEUTROPHILS RELATIVE PERCENT: 68.5 % (ref 43–80)
PDW BLD-RTO: 14.7 FL (ref 11.5–15)
PLATELET # BLD: 220 E9/L (ref 130–450)
PMV BLD AUTO: 10.3 FL (ref 7–12)
POTASSIUM REFLEX MAGNESIUM: 4.5 MMOL/L (ref 3.5–5)
R (REACTION TIME): 3.4 MIN (ref 5–10)
RBC # BLD: 3.73 E12/L (ref 3.5–5.5)
SODIUM BLD-SCNC: 137 MMOL/L (ref 132–146)
TROPONIN, HIGH SENSITIVITY: 20 NG/L (ref 0–9)
WBC # BLD: 6.3 E9/L (ref 4.5–11.5)

## 2021-08-20 PROCEDURE — 85384 FIBRINOGEN ACTIVITY: CPT

## 2021-08-20 PROCEDURE — 93010 ELECTROCARDIOGRAM REPORT: CPT | Performed by: INTERNAL MEDICINE

## 2021-08-20 PROCEDURE — 6370000000 HC RX 637 (ALT 250 FOR IP): Performed by: STUDENT IN AN ORGANIZED HEALTH CARE EDUCATION/TRAINING PROGRAM

## 2021-08-20 PROCEDURE — 80048 BASIC METABOLIC PNL TOTAL CA: CPT

## 2021-08-20 PROCEDURE — 85576 BLOOD PLATELET AGGREGATION: CPT

## 2021-08-20 PROCEDURE — 85347 COAGULATION TIME ACTIVATED: CPT

## 2021-08-20 PROCEDURE — 96366 THER/PROPH/DIAG IV INF ADDON: CPT

## 2021-08-20 PROCEDURE — 84484 ASSAY OF TROPONIN QUANT: CPT

## 2021-08-20 PROCEDURE — 6360000002 HC RX W HCPCS

## 2021-08-20 PROCEDURE — 99222 1ST HOSP IP/OBS MODERATE 55: CPT | Performed by: NEUROLOGICAL SURGERY

## 2021-08-20 PROCEDURE — 85025 COMPLETE CBC W/AUTO DIFF WBC: CPT

## 2021-08-20 RX ORDER — ACETAMINOPHEN 325 MG/1
650 TABLET ORAL EVERY 4 HOURS PRN
Status: DISCONTINUED | OUTPATIENT
Start: 2021-08-20 | End: 2021-08-20 | Stop reason: HOSPADM

## 2021-08-20 RX ORDER — LEVETIRACETAM 500 MG/1
500 TABLET ORAL 2 TIMES DAILY
Qty: 10 TABLET | Refills: 0 | Status: SHIPPED | OUTPATIENT
Start: 2021-08-20 | End: 2022-10-26

## 2021-08-20 RX ADMIN — LEVETIRACETAM 500 MG: 5 INJECTION, SOLUTION INTRAVENOUS at 08:19

## 2021-08-20 RX ADMIN — ACETAMINOPHEN 650 MG: 325 TABLET ORAL at 00:44

## 2021-08-20 ASSESSMENT — ENCOUNTER SYMPTOMS
SHORTNESS OF BREATH: 0
VOMITING: 0
VOMITING: 0
BACK PAIN: 0
BACK PAIN: 0
NAUSEA: 0
NAUSEA: 0
PHOTOPHOBIA: 0
SHORTNESS OF BREATH: 0
TROUBLE SWALLOWING: 0
RHINORRHEA: 0
ABDOMINAL DISTENTION: 0

## 2021-08-20 ASSESSMENT — PAIN SCALES - GENERAL: PAINLEVEL_OUTOF10: 7

## 2021-08-20 NOTE — PROGRESS NOTES
PCP is Zelalem Smiht MD  Office notified of admission.       Electronically signed by Tin Topete RN MSN APRN-NP [facility] NP  [facility] 8/20/2021 4:08 PM

## 2021-08-20 NOTE — ED NOTES
Patient is A&Ox4 states she has ice pack on back of head states tylenol doesn't work for her.      Luis Tellez, RN  08/20/21 3596

## 2021-08-20 NOTE — ED NOTES
Discharge paper work provided and explained. Patient is calling Son for ride home.      Santhosh Blount RN  08/20/21 2590

## 2021-08-20 NOTE — DISCHARGE SUMMARY
Physician Discharge Summary     Patient ID:  Valencia Lundberg  [id number]  21 y.o.  1934    Admit date: 8/19/2021    Discharge date and time: 8/20/2021  2:39 PM     Admitting Physician: Jie Cabrera MD      Admission Diagnoses: Fall at home, initial encounter [[id number] Guan, M55.630]    Discharge Diagnoses: Active Problems:    Fall    SDH (subdural hematoma) (HCC)    Syncope  Resolved Problems:    * No resolved hospital problems. *      Admission Condition: fair    Discharged Condition: stable    Indication for Admission: Syncopal fall    Hospital Course/Procedures/Operation/treatments:   8/20: Patient doing well overnight. Headache resolved. Intermittently bradycardic . Sees a physician for syncope at [facility] and has an apt with the syncope clinic set up. GCS 15. Desires to go home. Consults:   IP CONSULT TO TRAUMA SURGERY  IP CONSULT TO NEUROSURGERY    Significant Diagnostic Studies:   XR PELVIS (1-2 VIEWS)    Result Date: 8/19/2021  EXAMINATION: ONE XRAY VIEW OF THE PELVIS 8/19/2021 7:16 pm COMPARISON: None. HISTORY: ORDERING SYSTEM PROVIDED HISTORY: trauma TECHNOLOGIST PROVIDED HISTORY: Reason for exam:->trauma What reading provider will be dictating this exam?->CRC FINDINGS: No pelvic fracture. No diastasis involving SI joints or symphysis pubis. No hip fracture or dislocation. No evidence of pelvic fracture or hip fracture. CT HEAD WO CONTRAST    Result Date: 8/19/2021  EXAMINATION: CT OF THE HEAD WITHOUT CONTRAST  8/19/2021 6:02 pm TECHNIQUE: CT of the head was performed without the administration of intravenous contrast. Dose modulation, iterative reconstruction, and/or weight based adjustment of the mA/kV was utilized to reduce the radiation dose to as low as reasonably achievable. COMPARISON: CT head without contrast, 08/19/2021.  HISTORY: ORDERING SYSTEM PROVIDED HISTORY: trauma TECHNOLOGIST PROVIDED HISTORY: Reason for exam:->trauma Has a \"code stroke\" or \"stroke alert\" been condition->Emergency Medical Condition (MA) FINDINGS: Small 2 mm in thickness subdural hematoma is identified along the midline falx. There is also mild subarachnoid hemorrhage in the left frontal area. There is diffuse atrophy with the dilated ventricles and prominence of the sulci. The remainder of the brain parenchyma has normal architecture and attenuation. There is no acute stroke or midline shift. Large scalp hematoma is identified in the posterior parietal region. Small 2 mm in thickness subdural hematoma along the midline falx and small subarachnoid hemorrhage in the left frontal area. Diffuse age-appropriate atrophy and small-vessel ischemic disease. Large scalp hematoma in the posterior parietal region. Critical results were called by Dr. Leonela Knowles to Dr. Nick Archer. On 8/19/2021 at 13:17. CT Cervical Spine WO Contrast    Result Date: 8/19/2021  EXAMINATION: CT OF THE CERVICAL SPINE WITHOUT CONTRAST 8/19/2021 12:51 pm TECHNIQUE: CT of the cervical spine was performed without the administration of intravenous contrast. Multiplanar reformatted images are provided for review. Dose modulation, iterative reconstruction, and/or weight based adjustment of the mA/kV was utilized to reduce the radiation dose to as low as reasonably achievable. COMPARISON: None. HISTORY: ORDERING SYSTEM PROVIDED HISTORY: fall TECHNOLOGIST PROVIDED HISTORY: Reason for exam:->fall Decision Support Exception - unselect if not a suspected or confirmed emergency medical condition->Emergency Medical Condition (MA) FINDINGS: Motion artifact mildly limits evaluation of cervical spine. BONES/ALIGNMENT: There is no acute fracture or traumatic malalignment. DEGENERATIVE CHANGES: Multilevel degenerative disc disease is identified most prominent at C6-C7 with uncovertebral arthropathy. There is multilevel facet arthropathy seen throughout the cervical spine. SOFT TISSUES: There is no prevertebral soft tissue swelling. No acute compression deformities. Multilevel degenerative changes as described above. Focal 2.4 cm right thyroid nodule identified. Further evaluation with outpatient thyroid ultrasound recommended if not previously obtained. XR CHEST PORTABLE    Result Date: 8/19/2021  EXAMINATION: ONE XRAY VIEW OF THE CHEST 8/19/2021 7:16 pm COMPARISON: August 20, 2019 HISTORY: ORDERING SYSTEM PROVIDED HISTORY: trauma TECHNOLOGIST PROVIDED HISTORY: Reason for exam:->trauma What reading provider will be dictating this exam?->CRC FINDINGS: No airspace opacity or pleural effusion. The heart is normal size. No pneumothorax. No free air beneath the hemidiaphragms. No pneumonia or pleural effusion. Discharge Exam:  Awake and alert  Follows commands  Heart: Regular  Lungs: Fairly clear bilaterally  Abdomen: Soft; bowel sounds active  Skin: Warm/dry    Disposition: Home  In process/preliminary results:  Outstanding Order Results     No orders found from 7/21/2021 to 8/20/2021. Patient Instructions:   Discharge Medication List as of 8/20/2021  1:02 PM           Details   levETIRAcetam (KEPPRA) 500 MG tablet Take 1 tablet by mouth 2 times daily for 5 days, Disp-10 tablet, R-0Normal              Details   atorvastatin (LIPITOR) 20 MG tablet Take by mouthHistorical Med      Vitamin D, Cholecalciferol, 1000 units TABS Take by mouthHistorical Med      citalopram (CELEXA) 20 MG tablet Take 20 mg by mouthHistorical Med      cyanocobalamin 500 MCG tablet Take by mouthHistorical Med      gabapentin (NEURONTIN) 600 MG tablet Take 300 mg by mouth.  Historical Med      levothyroxine (SYNTHROID) 50 MCG tablet Take 50 mcg by mouthHistorical Med      metoprolol succinate (TOPROL XL) 25 MG extended release tablet Take by mouthHistorical Med               MILD TRAUMATIC BRAIN INJURY OR CONCUSSION  A mild traumatic brain injury (TBI) is one that causes some degree of injury to the brain causing symptoms ranging from a brief period of confusion to loss of consciousness (being knocked out). There is no major bruising or bleeding in the brain but symptoms can last from hours to months depending on the severity of the injury. Family or friends need to observe any change in behavior for the next 48 hours. Delayed effects from head injury do occur occasionally and can be due to slow bleeding or swelling around the surface of the brain. These effects may occur even if the x-rays/CT scans were normal.  Please observe the following symptoms during the next 24-48 hours. CALL 911 if:   Pupils (black part in the center of the eye) are unequal in size, and this is new.  Seizure (convulsion).  Not responding to others/won't wake up or very hard to wake up   Faints (passes out)   Vomiting more than 3 times    Notify the TRAUMA CLINIC if any of the following symptoms occur:   Severe headache -- Mild headache may last for days. Report worsening pain or uncontrolled pain with prescribed medicine.  Numbness, tingling or weakness -- Present in arms or legs; unsteady walking.  Eye Changes/light sensation -- Vision problems; blurred or double-vision; unequal sized pupils.  Nausea/Vomiting -- Episodes of vomiting may occur initially after a head injury. Persistent vomiting or difficulty taking medication by mouth.  Increased Sleepiness -- Difficulty waking from sleep with increased confusion.  Dizziness -- Does not go away or occurs repeatedly. Vomiting may accompany dizziness.  Drainage -- Clear fluid or blood from the nose and ears.  Fever -- Temperature over 101 degrees.  Neck Pain. The First Four Weeks  The symptoms below are common after a mild brain injury.  They usually get better on their own within a few weeks:    feeling tired or low   problems falling or staying asleep   feeling confused, poor concentration, or slow to answer questions   feeling dizzy, poor balance, or poor coordination   being sensitive to light   being sensitive to sounds   ringing in the ears   a mild headache, sometimes with nausea and/or vomiting   being irritable, having mood swings, or feeling somewhat sad or down  Contact the TRAUMA CLINIC if your symptoms are affecting your everyday activities. Remember that letting yourself get too tired can make your symptoms worse. Listen to your body: if doing a certain activity increases your symptoms, take a break from that activity. Build up the amount of time you do an activity and stay under the threshold of symptoms. Long term Effects (Post-Concussive Syndrome)    Notify physician if any of the following persists longer than 2-4 weeks.  Difficulty with concentration or attention (easily distracted)   Frequent headaches   Memory problems    Sensitivity to light    Sleeping difficulties      There is a higher risk of having a more serious head injury if:   Previous history of head injury or concussion   Taking medicine that thins your blood, or have a bleeding disorder   Have other neurologic (brain) problems   Have difficulty walking or frequent falls   Active in high impact contact sports, like soccer or football. Activities   Stay away from activities that could cause another head injury (like sports), until the doctor says it's okay. A second blow to the head can cause more damage to the brain   Limit reading, television, video games, etc. the first 48 hours. Your brain needs to rest so that it can recover. You may find that it helps to take time off school or work.  Limit exposure to bright lights, loud noises, and crowds for the first 48 hours, as these can make your symptoms worse   Limit use of screens, such as an IPad, computer, cellphone, TV, etc, as these can make symptoms, especially headaches, worse. Rest and Sleep   Rest for the first 24 hours; it's one of the best things to help your brain recover.   It's okay to sleep if you want   You don't have

## 2021-08-20 NOTE — ED NOTES
Perfect serve message sent to Dr. Juan Manuel Kamara updating on pt request for pain meds.       Rosa Barber RN  08/20/21 8377

## 2021-08-20 NOTE — CONSULTS
NEUROSURGERY CONSULTATION     Chief Complaint: subdural hematoma, subarachnoid hemorrhage    HPI:   Estella Alvarado is a 80 y.o.  female seen by neurosurgery for evaluation and treatment following a fall with head trauma. Mild subarachnoid hemorrhage and subdural hematoma found on CT scan. Past Medical History:   Diagnosis Date    Atrial fibrillation ([address]     Compression fracture of L2 ([facility]     Hyperlipidemia     Hypertension     Thyroid disease      Past Surgical History:   Procedure Laterality Date    APPENDECTOMY      BACK SURGERY      BREAST SURGERY      CHOLECYSTECTOMY      HYSTERECTOMY      TONSILLECTOMY AND ADENOIDECTOMY        History reviewed. No pertinent family history. Social History     Socioeconomic History    Marital status:      Spouse name: Not on file    Number of children: Not on file    Years of education: Not on file    Highest education level: Not on file   Occupational History    Not on file   Tobacco Use    Smoking status: Never Smoker    Smokeless tobacco: Never Used   Substance and Sexual Activity    Alcohol use: Not on file    Drug use: Not on file    Sexual activity: Not on file   Other Topics Concern    Not on file   Social History Narrative    Not on file     Social Determinants of Health     Financial Resource Strain:     Difficulty of Paying Living Expenses:    Food Insecurity:     Worried About Running Out of Food in the Last Year:     [address] in the Last Year:    Transportation Needs:     Lack of Transportation (Medical):      Lack of Transportation (Non-Medical):    Physical Activity:     Days of Exercise per Week:     Minutes of Exercise per Session:    Stress:     Feeling of Stress :    Social Connections:     Frequency of Communication with Friends and Family:     Frequency of Social Gatherings with Friends and Family:     Attends [employer] Services:     Active Member of Clubs or Organizations:     Attends Club or Organization Meetings:     Marital Status:    Intimate Partner Violence:     Fear of Current or Ex-Partner:     Emotionally Abused:     Physically Abused:     Sexually Abused:        Medications:   Current Facility-Administered Medications   Medication Dose Route Frequency Provider Last Rate Last Admin    acetaminophen (TYLENOL) tablet 650 mg  650 mg Oral Q4H PRN Bassem Tay MD   650 mg at 08/20/21 0044    levETIRAcetam (KEPPRA) 500 mg/100 mL IVPB  500 mg Intravenous BID Rod Pedersen, DO 99 mL/hr at 08/20/21 0819 500 mg at 08/20/21 0819    sodium chloride flush 0.9 % injection 10 mL  10 mL Intravenous 2 times per day Rod Hewittn, DO        sodium chloride flush 0.9 % injection 10 mL  10 mL Intravenous PRN Rod Hewittn, DO        0.9 % sodium chloride infusion  25 mL Intravenous PRN Rod Hewittn, DO        ondansetron (ZOFRAN-ODT) disintegrating tablet 4 mg  4 mg Oral Q8H PRN Rod Hewittn, DO        Or    ondansetron (ZOFRAN) injection 4 mg  4 mg Intravenous Q6H PRN Rod Hewittn, DO        polyethylene glycol (GLYCOLAX) packet 17 g  17 g Oral Daily Lorenzo Zamoranow, DO        bisacodyl (DULCOLAX) EC tablet 5 mg  5 mg Oral Daily PRN Rod Hewittn, DO        perflutren lipid microspheres (DEFINITY) injection 1.65 mg  1.5 mL Intravenous ONCE PRN Rod Hewittn, DO         Current Outpatient Medications   Medication Sig Dispense Refill    apixaban (ELIQUIS) 5 MG TABS tablet Take by mouth 2 times daily      atorvastatin (LIPITOR) 20 MG tablet Take by mouth      Vitamin D, Cholecalciferol, 1000 units TABS Take by mouth      gabapentin (NEURONTIN) 600 MG tablet Take 300 mg by mouth.        levothyroxine (SYNTHROID) 50 MCG tablet Take 50 mcg by mouth      metoprolol succinate (TOPROL XL) 25 MG extended release tablet Take by mouth      aspirin 81 MG tablet Take 81 mg by mouth       citalopram (CELEXA) 20 MG tablet Take 20 mg by mouth      cyanocobalamin 500 MCG tablet Take by mouth          Allergies:    Macrodantin [nitrofurantoin] and Codeine       Review of Systems   Constitutional: Negative for fever. HENT: Negative for trouble swallowing. Eyes: Negative for visual disturbance. Respiratory: Negative for shortness of breath. Cardiovascular: Negative for chest pain. Gastrointestinal: Negative for abdominal distention, nausea and vomiting. Endocrine: Negative for polyuria. Genitourinary: Negative for dysuria. Musculoskeletal: Negative for back pain and neck pain. Skin: Negative for rash. Neurological: Negative for facial asymmetry and numbness. Psychiatric/Behavioral: Negative for confusion. Physical Exam  Constitutional:       Appearance: Normal appearance. HENT:      Head: Normocephalic and atraumatic. Eyes:      Extraocular Movements: Extraocular movements intact. Conjunctiva/sclera: Conjunctivae normal.      Pupils: Pupils are equal, round, and reactive to light. Cardiovascular:      Rate and Rhythm: Normal rate. Pulmonary:      Effort: Pulmonary effort is normal.      Breath sounds: Normal breath sounds. Abdominal:      General: Abdomen is flat. There is no distension. Palpations: Abdomen is soft. Musculoskeletal:         General: Normal range of motion. Cervical back: Normal range of motion and neck supple. Skin:     General: Skin is warm and dry. Neurological:      Mental Status: She is alert. Comments: Alert and oriented x3  CN 3-12 intact  Motor strength full  Sensation intact to LT  Reflexes normal   Psychiatric:         Mood and Affect: Mood normal.         Thought Content:  Thought content normal.          BP (!) 122/57   Pulse (!) 48   Temp 97.1 °F (36.2 °C)   Resp 18   Ht 5' 3\" (1.6 m)   Wt 155 lb (70.3 kg)   SpO2 97%   BMI 27.46 kg/m²        Assessment:   · Subdural hematoma, subarachnoid hemorrhage    Plan:  · Plan pending second CT scan of head wo contrast  · If CT is unchanged: No AC/AP medications   · Serial neuro exams  · Keppra for 7 days  · F/U in clinic in 1 month with new CT scan of head wo contrast      Electronically signed by OSCAR Sanchez on 8/20/2021 at 10:11 AM

## 2021-08-20 NOTE — ED NOTES
Per patient neurology came and saw patient and told her she can be discharged. Page out to trauma services to verify if neuro spoke to trauma about this discharge.      Sangeetha Garcia RN  08/20/21 2221

## 2021-09-02 ENCOUNTER — HOSPITAL ENCOUNTER (OUTPATIENT)
Dept: CT IMAGING | Age: 86
Discharge: HOME | End: 2021-09-02
Payer: MEDICARE

## 2021-09-02 DIAGNOSIS — I62.00 NONTRAUMATIC SUBDURAL HEMORRHAGE, UNSPECIFIED (HCC): ICD-10-CM

## 2021-09-02 PROCEDURE — 70450 CT HEAD/BRAIN W/O DYE: CPT

## 2021-09-05 PROBLEM — S06.5XAA SDH (SUBDURAL HEMATOMA): Status: ACTIVE | Noted: 2021-09-05

## 2021-09-05 PROBLEM — R55 SYNCOPE: Status: ACTIVE | Noted: 2021-09-05

## 2021-09-05 NOTE — PROGRESS NOTES
Delayed entry please refer to H&P by Dr. Sheree Winters 8/19/2021     [address] [place] [address] Rd NOTE     I have examined the patient, reviewed the record, and discussed the case with the APN/  Resident. I have reviewed all relevant labs and imaging data. Please refer to the  APN/ resident's note. I agree with the  assessment and plan with the following corrections/ additions. The following summarizes my clinical findings and independent assessment.      Fall   Syncope   SDH     NS to see   Monitoring floor but will need follow up at [facility] with the syncope clinic     Boubacar Lee MD

## 2021-09-07 NOTE — PROGRESS NOTES
TRAUMA TERTIARY    Admit Date: 8/19/2021    [facility]    CC:    Chief Complaint   Patient presents with   Haley Baugh Fall     tx from [facility], fell at dr office earlier today, +LOC, approx 15 seconds, +thinners, SDH 2mm, SAH       Alcohol pre-screening:  How many times in the past year have you had 4-5 or more drinks in a day?  none    Subjective:       81 y/o female with past medical history pertinent for A-fib presents to ED for syncopal fall that occurred earlier today. Patient is a transfer from [facility]. She states she was at home stood up, got lightheaded and and passed out. She says she has been feeling more dizzy lately. She has seen EP in [facility] and has an appointment to see them later this month. Denies LOC. Found to have mild SAH at [address] [place] [address]. Repeat scans stable. Objective:   No data found. No intake/output data recorded. No intake/output data recorded. Radiology:  XR CHEST PORTABLE   Final Result   No pneumonia or pleural effusion. XR PELVIS (1-2 VIEWS)   Final Result   No evidence of pelvic fracture or hip fracture. CT HEAD WO CONTRAST   Final Result   1. Minimal subdural blood along the interhemispheric falx is stable. 2. Minimal posttraumatic subarachnoid blood in the high left frontal lobe is   slightly less prominent. 3. Stable posterior scalp hematoma. 4. No mass effect or midline shift. CT HEAD WO CONTRAST    (Results Pending)       PHYSICAL EXAM:   GCS:  4 - Opens eyes on own   6 - Follows simple motor commands  5 - Alert and oriented    Pupil size: Left 4 mm     Right 4 mm  Pupil reaction: Yes  Wiggles fingers: Left Yes     Right Yes  Wiggles toes: Left Yes     Right Yes  Plantar flexion: Left normal     Right normal    GENERAL:  NAD. A&Ox3. HEAD:  Normocephalic, Cephalohematoma to occiput region of head. .  LUNGS:  No increased work of breathing. CTAB. CARDIOVASCULAR: Bradycardia   ABDOMEN:  Soft, non-distended, non-tender.  No guarding, rigidity, rebound. EXTREMITIES:  MAEx4. No LE edema. Atraumatic. SKIN:  Warm and dry      Spine:       Spine Tenderness ROM   Cervical 0 /10 Normal   Thoracic 0 /10 Normal   Lumbar 0 /10 Normal     Musculoskeletal:    Joint Tenderness Swelling/Deformity ROM   Right shoulder absent absent normal   Left shoulder absent absent normal   Right elbow absent absent normal   Left elbow absent absent normal   Right wrist absent absent normal   Left wrist absent absent normal   Right hand grasp absent absent normal   Left hand grasp absent absent normal   Right hip absent absent normal   Left hip absent absent normal   Right knee absent absent normal   Left knee absent absent normal   Right ankle absent absent normal   Left ankle absent absent normal   Right foot absent absent normal   Left foot absent absent normal         CONSULTS: Neurosurgery      Active Problems:    Fall    SDH (subdural hematoma) (HCC)    Syncope  Resolved Problems:    * No resolved hospital problems. *        Assessment/Plan:     Neuro:  No acute issues. GCS 15. Pain control. Repeat scans stable. Hold anticoags/antiplatelets  CV: No acute issues. Pulm: No acute issues. Encourage IS/SMI. GI: No acute issues. Bowel regimen. Zofran PRN. Renal: No acute issues. Endocrine: No acute issues. MSK: No acute issues. PT/OT. Heme: No acute issues. ID: No acute issues.     Pain/Analgesia: Tylenol  Bowel Regimen: Glycolax PRN  DVT PPx:  SCDs,       Code status:  Prior     Disposition:  Discharge home pending neurosurgery reccomendations    Ana Lundberg DO  Resident, PGY-1  9/7/2021  7:20 AM
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
## 2021-12-05 ENCOUNTER — HOSPITAL ENCOUNTER (OUTPATIENT)
Age: 86
Setting detail: OBSERVATION
Discharge: HOME | End: 2021-12-06
Attending: EMERGENCY MEDICINE | Admitting: INTERNAL MEDICINE
Payer: MEDICARE

## 2021-12-05 ENCOUNTER — APPOINTMENT (OUTPATIENT)
Dept: CT IMAGING | Age: 86
End: 2021-12-05
Payer: MEDICARE

## 2021-12-05 DIAGNOSIS — N39.0 ACUTE UTI: ICD-10-CM

## 2021-12-05 DIAGNOSIS — R07.9 ACUTE CHEST PAIN: Primary | ICD-10-CM

## 2021-12-05 DIAGNOSIS — R91.1 LUNG NODULE: ICD-10-CM

## 2021-12-05 PROBLEM — E03.9 HYPOTHYROIDISM: Status: ACTIVE | Noted: 2021-12-05

## 2021-12-05 PROBLEM — E78.2 MIXED HYPERLIPIDEMIA: Status: ACTIVE | Noted: 2021-12-05

## 2021-12-05 PROBLEM — E11.9 DIABETES MELLITUS WITHOUT COMPLICATION (HCC): Status: ACTIVE | Noted: 2020-08-12

## 2021-12-05 PROBLEM — I48.91 ATRIAL FIBRILLATION (HCC): Status: ACTIVE | Noted: 2021-12-05

## 2021-12-05 LAB
ALBUMIN SERPL-MCNC: 4 G/DL (ref 3.5–5.2)
ALP BLD-CCNC: 116 U/L (ref 35–104)
ALT SERPL-CCNC: 14 U/L (ref 0–32)
ANION GAP SERPL CALCULATED.3IONS-SCNC: 11 MMOL/L (ref 7–16)
AST SERPL-CCNC: 23 U/L (ref 0–31)
BACTERIA: ABNORMAL /HPF
BASOPHILS ABSOLUTE: 0.03 E9/L (ref 0–0.2)
BASOPHILS RELATIVE PERCENT: 0.3 % (ref 0–2)
BILIRUB SERPL-MCNC: 0.4 MG/DL (ref 0–1.2)
BILIRUBIN URINE: NEGATIVE
BLOOD, URINE: ABNORMAL
BUN BLDV-MCNC: 21 MG/DL (ref 6–23)
CALCIUM SERPL-MCNC: 10 MG/DL (ref 8.6–10.2)
CHLORIDE BLD-SCNC: 99 MMOL/L (ref 98–107)
CLARITY: CLEAR
CO2: 25 MMOL/L (ref 22–29)
COLOR: YELLOW
CREAT SERPL-MCNC: 0.9 MG/DL (ref 0.5–1)
EOSINOPHILS ABSOLUTE: 0.09 E9/L (ref 0.05–0.5)
EOSINOPHILS RELATIVE PERCENT: 0.9 % (ref 0–6)
EPITHELIAL CELLS, UA: ABNORMAL /HPF
GFR AFRICAN AMERICAN: >60
GFR NON-AFRICAN AMERICAN: 59 ML/MIN/1.73
GLUCOSE BLD-MCNC: 118 MG/DL (ref 74–99)
GLUCOSE URINE: NEGATIVE MG/DL
HCT VFR BLD CALC: 42.6 % (ref 34–48)
HEMOGLOBIN: 13.7 G/DL (ref 11.5–15.5)
IMMATURE GRANULOCYTES #: 0.03 E9/L
IMMATURE GRANULOCYTES %: 0.3 % (ref 0–5)
KETONES, URINE: NEGATIVE MG/DL
LEUKOCYTE ESTERASE, URINE: ABNORMAL
LIPASE: 20 U/L (ref 13–60)
LYMPHOCYTES ABSOLUTE: 0.62 E9/L (ref 1.5–4)
LYMPHOCYTES RELATIVE PERCENT: 6.2 % (ref 20–42)
MAGNESIUM: 2 MG/DL (ref 1.6–2.6)
MCH RBC QN AUTO: 29.9 PG (ref 26–35)
MCHC RBC AUTO-ENTMCNC: 32.2 % (ref 32–34.5)
MCV RBC AUTO: 93 FL (ref 80–99.9)
MONOCYTES ABSOLUTE: 0.68 E9/L (ref 0.1–0.95)
MONOCYTES RELATIVE PERCENT: 6.8 % (ref 2–12)
NEUTROPHILS ABSOLUTE: 8.53 E9/L (ref 1.8–7.3)
NEUTROPHILS RELATIVE PERCENT: 85.5 % (ref 43–80)
NITRITE, URINE: POSITIVE
PDW BLD-RTO: 14.2 FL (ref 11.5–15)
PH UA: 5.5 (ref 5–9)
PLATELET # BLD: 261 E9/L (ref 130–450)
PMV BLD AUTO: 10.4 FL (ref 7–12)
POTASSIUM REFLEX MAGNESIUM: 4.2 MMOL/L (ref 3.5–5)
PRO-BNP: 471 PG/ML (ref 0–450)
PROTEIN UA: NEGATIVE MG/DL
RBC # BLD: 4.58 E12/L (ref 3.5–5.5)
RBC UA: ABNORMAL /HPF (ref 0–2)
SODIUM BLD-SCNC: 135 MMOL/L (ref 132–146)
SPECIFIC GRAVITY UA: 1.02 (ref 1–1.03)
TOTAL PROTEIN: 7.3 G/DL (ref 6.4–8.3)
TROPONIN, HIGH SENSITIVITY: 16 NG/L (ref 0–9)
TROPONIN, HIGH SENSITIVITY: 18 NG/L (ref 0–9)
TROPONIN, HIGH SENSITIVITY: 19 NG/L (ref 0–9)
TSH SERPL DL<=0.05 MIU/L-ACNC: 1.16 UIU/ML (ref 0.27–4.2)
UROBILINOGEN, URINE: 0.2 E.U./DL
WBC # BLD: 10 E9/L (ref 4.5–11.5)
WBC UA: ABNORMAL /HPF (ref 0–5)

## 2021-12-05 PROCEDURE — 74177 CT ABD & PELVIS W/CONTRAST: CPT

## 2021-12-05 PROCEDURE — 6370000000 HC RX 637 (ALT 250 FOR IP): Performed by: INTERNAL MEDICINE

## 2021-12-05 PROCEDURE — 6360000004 HC RX CONTRAST MEDICATION: Performed by: RADIOLOGY

## 2021-12-05 PROCEDURE — 80053 COMPREHEN METABOLIC PANEL: CPT

## 2021-12-05 PROCEDURE — G0378 HOSPITAL OBSERVATION PER HR: HCPCS

## 2021-12-05 PROCEDURE — 84443 ASSAY THYROID STIM HORMONE: CPT

## 2021-12-05 PROCEDURE — 71275 CT ANGIOGRAPHY CHEST: CPT

## 2021-12-05 PROCEDURE — 93005 ELECTROCARDIOGRAM TRACING: CPT | Performed by: STUDENT IN AN ORGANIZED HEALTH CARE EDUCATION/TRAINING PROGRAM

## 2021-12-05 PROCEDURE — 84484 ASSAY OF TROPONIN QUANT: CPT

## 2021-12-05 PROCEDURE — 2580000003 HC RX 258: Performed by: STUDENT IN AN ORGANIZED HEALTH CARE EDUCATION/TRAINING PROGRAM

## 2021-12-05 PROCEDURE — 36415 COLL VENOUS BLD VENIPUNCTURE: CPT

## 2021-12-05 PROCEDURE — 83735 ASSAY OF MAGNESIUM: CPT

## 2021-12-05 PROCEDURE — 96366 THER/PROPH/DIAG IV INF ADDON: CPT

## 2021-12-05 PROCEDURE — 85025 COMPLETE CBC W/AUTO DIFF WBC: CPT

## 2021-12-05 PROCEDURE — 99284 EMERGENCY DEPT VISIT MOD MDM: CPT

## 2021-12-05 PROCEDURE — 81001 URINALYSIS AUTO W/SCOPE: CPT

## 2021-12-05 PROCEDURE — 83690 ASSAY OF LIPASE: CPT

## 2021-12-05 PROCEDURE — 99204 OFFICE O/P NEW MOD 45 MIN: CPT | Performed by: INTERNAL MEDICINE

## 2021-12-05 PROCEDURE — 96365 THER/PROPH/DIAG IV INF INIT: CPT

## 2021-12-05 PROCEDURE — 6360000002 HC RX W HCPCS: Performed by: STUDENT IN AN ORGANIZED HEALTH CARE EDUCATION/TRAINING PROGRAM

## 2021-12-05 PROCEDURE — 93005 ELECTROCARDIOGRAM TRACING: CPT | Performed by: INTERNAL MEDICINE

## 2021-12-05 PROCEDURE — 83880 ASSAY OF NATRIURETIC PEPTIDE: CPT

## 2021-12-05 PROCEDURE — 6370000000 HC RX 637 (ALT 250 FOR IP): Performed by: STUDENT IN AN ORGANIZED HEALTH CARE EDUCATION/TRAINING PROGRAM

## 2021-12-05 RX ORDER — LANOLIN ALCOHOL/MO/W.PET/CERES
500 CREAM (GRAM) TOPICAL DAILY
Status: DISCONTINUED | OUTPATIENT
Start: 2021-12-05 | End: 2021-12-06 | Stop reason: HOSPADM

## 2021-12-05 RX ORDER — VITAMIN B COMPLEX
1000 TABLET ORAL DAILY
Status: DISCONTINUED | OUTPATIENT
Start: 2021-12-05 | End: 2021-12-06 | Stop reason: HOSPADM

## 2021-12-05 RX ORDER — NITROGLYCERIN 0.4 MG/1
0.4 TABLET SUBLINGUAL ONCE
Status: COMPLETED | OUTPATIENT
Start: 2021-12-05 | End: 2021-12-05

## 2021-12-05 RX ORDER — LEVOTHYROXINE SODIUM 0.05 MG/1
50 TABLET ORAL DAILY
Status: DISCONTINUED | OUTPATIENT
Start: 2021-12-06 | End: 2021-12-06 | Stop reason: HOSPADM

## 2021-12-05 RX ORDER — NITROGLYCERIN 0.4 MG/1
0.4 TABLET SUBLINGUAL EVERY 5 MIN PRN
Status: COMPLETED | OUTPATIENT
Start: 2021-12-05 | End: 2021-12-05

## 2021-12-05 RX ORDER — GABAPENTIN 300 MG/1
300 CAPSULE ORAL NIGHTLY
Status: DISCONTINUED | OUTPATIENT
Start: 2021-12-05 | End: 2021-12-06 | Stop reason: HOSPADM

## 2021-12-05 RX ORDER — CITALOPRAM 20 MG/1
20 TABLET ORAL DAILY
Status: DISCONTINUED | OUTPATIENT
Start: 2021-12-05 | End: 2021-12-06 | Stop reason: HOSPADM

## 2021-12-05 RX ORDER — MORPHINE SULFATE 2 MG/ML
1 INJECTION, SOLUTION INTRAMUSCULAR; INTRAVENOUS ONCE
Status: DISCONTINUED | OUTPATIENT
Start: 2021-12-05 | End: 2021-12-06 | Stop reason: HOSPADM

## 2021-12-05 RX ORDER — METOPROLOL SUCCINATE 25 MG/1
25 TABLET, EXTENDED RELEASE ORAL 3 TIMES DAILY PRN
Status: DISCONTINUED | OUTPATIENT
Start: 2021-12-05 | End: 2021-12-06 | Stop reason: HOSPADM

## 2021-12-05 RX ORDER — METOPROLOL SUCCINATE 25 MG/1
12.5 TABLET, EXTENDED RELEASE ORAL 3 TIMES DAILY PRN
Status: DISCONTINUED | OUTPATIENT
Start: 2021-12-05 | End: 2021-12-06 | Stop reason: HOSPADM

## 2021-12-05 RX ORDER — ACETAMINOPHEN 325 MG/1
650 TABLET ORAL EVERY 4 HOURS PRN
Status: DISCONTINUED | OUTPATIENT
Start: 2021-12-05 | End: 2021-12-06 | Stop reason: HOSPADM

## 2021-12-05 RX ORDER — ATORVASTATIN CALCIUM 20 MG/1
20 TABLET, FILM COATED ORAL NIGHTLY
Status: DISCONTINUED | OUTPATIENT
Start: 2021-12-05 | End: 2021-12-06 | Stop reason: HOSPADM

## 2021-12-05 RX ORDER — ASPIRIN 81 MG/1
324 TABLET, CHEWABLE ORAL ONCE
Status: COMPLETED | OUTPATIENT
Start: 2021-12-05 | End: 2021-12-05

## 2021-12-05 RX ADMIN — NITROGLYCERIN 0.4 MG: 0.4 TABLET, ORALLY DISINTEGRATING SUBLINGUAL at 15:30

## 2021-12-05 RX ADMIN — ASPIRIN 81 MG CHEWABLE TABLET 324 MG: 81 TABLET CHEWABLE at 09:22

## 2021-12-05 RX ADMIN — ACETAMINOPHEN 650 MG: 325 TABLET ORAL at 23:54

## 2021-12-05 RX ADMIN — NITROGLYCERIN 0.4 MG: 0.4 TABLET, ORALLY DISINTEGRATING SUBLINGUAL at 15:40

## 2021-12-05 RX ADMIN — IOPAMIDOL 75 ML: 755 INJECTION, SOLUTION INTRAVENOUS at 08:44

## 2021-12-05 RX ADMIN — Medication 1000 UNITS: at 13:37

## 2021-12-05 RX ADMIN — GABAPENTIN 300 MG: 300 CAPSULE ORAL at 21:44

## 2021-12-05 RX ADMIN — ALUMINUM HYDROXIDE, MAGNESIUM HYDROXIDE, AND SIMETHICONE: 200; 200; 20 SUSPENSION ORAL at 18:27

## 2021-12-05 RX ADMIN — NITROGLYCERIN 0.4 MG: 0.4 TABLET, ORALLY DISINTEGRATING SUBLINGUAL at 15:19

## 2021-12-05 RX ADMIN — ATORVASTATIN CALCIUM 20 MG: 20 TABLET, FILM COATED ORAL at 21:44

## 2021-12-05 RX ADMIN — NITROGLYCERIN 0.4 MG: 0.4 TABLET SUBLINGUAL at 09:48

## 2021-12-05 RX ADMIN — CITALOPRAM HYDROBROMIDE 20 MG: 20 TABLET ORAL at 13:38

## 2021-12-05 RX ADMIN — CYANOCOBALAMIN TAB 1000 MCG 500 MCG: 1000 TAB at 13:38

## 2021-12-05 RX ADMIN — PSYLLIUM HUSK 1 PACKET: 3.4 GRANULE ORAL at 15:27

## 2021-12-05 RX ADMIN — APIXABAN 5 MG: 5 TABLET, FILM COATED ORAL at 21:44

## 2021-12-05 RX ADMIN — CEFTRIAXONE SODIUM 1000 MG: 1 INJECTION, POWDER, FOR SOLUTION INTRAMUSCULAR; INTRAVENOUS at 09:50

## 2021-12-05 ASSESSMENT — ENCOUNTER SYMPTOMS
DIARRHEA: 0
COUGH: 0
EYE PAIN: 0
BACK PAIN: 0
COLOR CHANGE: 0
NAUSEA: 0
VOMITING: 0
SHORTNESS OF BREATH: 1
ABDOMINAL PAIN: 0

## 2021-12-05 ASSESSMENT — PAIN DESCRIPTION - ONSET
ONSET: ON-GOING

## 2021-12-05 ASSESSMENT — PAIN DESCRIPTION - PROGRESSION
CLINICAL_PROGRESSION: NOT CHANGED

## 2021-12-05 ASSESSMENT — PAIN DESCRIPTION - ORIENTATION
ORIENTATION: MID;UPPER;ANTERIOR
ORIENTATION: MID
ORIENTATION: MID;UPPER;ANTERIOR
ORIENTATION: MID
ORIENTATION: MID;UPPER;ANTERIOR

## 2021-12-05 ASSESSMENT — PAIN DESCRIPTION - LOCATION
LOCATION: CHEST
LOCATION: CHEST;HEAD
LOCATION: CHEST

## 2021-12-05 ASSESSMENT — PAIN SCALES - GENERAL
PAINLEVEL_OUTOF10: 7
PAINLEVEL_OUTOF10: 6
PAINLEVEL_OUTOF10: 7

## 2021-12-05 ASSESSMENT — PAIN - FUNCTIONAL ASSESSMENT
PAIN_FUNCTIONAL_ASSESSMENT: PREVENTS OR INTERFERES WITH MANY ACTIVE NOT PASSIVE ACTIVITIES

## 2021-12-05 ASSESSMENT — PAIN DESCRIPTION - FREQUENCY
FREQUENCY: CONTINUOUS

## 2021-12-05 ASSESSMENT — PAIN DESCRIPTION - DIRECTION: RADIATING_TOWARDS: UPWARDS

## 2021-12-05 ASSESSMENT — PAIN DESCRIPTION - PAIN TYPE
TYPE: ACUTE PAIN

## 2021-12-05 ASSESSMENT — PAIN DESCRIPTION - DESCRIPTORS
DESCRIPTORS: PRESSURE
DESCRIPTORS: PRESSURE;DISCOMFORT
DESCRIPTORS: PRESSURE
DESCRIPTORS: PRESSURE

## 2021-12-05 NOTE — PLAN OF CARE
Problem: Pain:  Goal: Pain level will decrease  Description: Pain level will decrease  Outcome: Not Met This Shift     Problem: Falls - Risk of:  Goal: Will remain free from falls  Description: Will remain free from falls  Outcome: Met This Shift

## 2021-12-05 NOTE — ED PROVIDER NOTES
HPI   55-year-old female patient presents to emergency department complaint of chest pain. Symptoms were sudden onset at 330 this morning. Patient states that when she takes a deep breath the pain is worse. Patient has history of MI in 2005. Patient follows with [facility] and has also had multiple syncopes in the past for which she goes to the syncope clinic and has been worked up for this, she has loop recorder in place. Patient does not report any syncope or head injury today. Patient describes her chest pain as a pressure sensation radiating up into her neck no pain in her back, pain 6 out of 10. She denies any fevers, chills, nausea, vomiting, diarrhea, urinary complaints, weakness, fatigue. Patient on Eliquis states she has been taking it as prescribed. Patient reports history of fall back in August with intracranial hemorrhage. Review of Systems   Constitutional: Negative for chills and fever. HENT: Negative for congestion. Eyes: Negative for pain. Respiratory: Positive for shortness of breath. Negative for cough. Cardiovascular: Positive for chest pain. Gastrointestinal: Negative for abdominal pain, diarrhea, nausea and vomiting. Genitourinary: Negative for difficulty urinating, dysuria and hematuria. Musculoskeletal: Negative for back pain. Skin: Negative for color change. All other systems reviewed and are negative. Physical Exam  Vitals and nursing note reviewed. Constitutional:       General: She is not in acute distress. HENT:      Head: Normocephalic and atraumatic. Nose: Nose normal. No congestion. Mouth/Throat:      Mouth: Mucous membranes are moist.      Pharynx: Oropharynx is clear. Eyes:      General: No scleral icterus. Conjunctiva/sclera: Conjunctivae normal.   Cardiovascular:      Rate and Rhythm: Normal rate and regular rhythm. Pulses: Normal pulses. Heart sounds: Normal heart sounds.    Pulmonary:      Effort: Pulmonary effort is normal. No respiratory distress. Breath sounds: Normal breath sounds. Abdominal:      General: Bowel sounds are normal.      Palpations: Abdomen is soft. Comments: Left lower quadrant tenderness to palpation   Skin:     General: Skin is warm and dry. Capillary Refill: Capillary refill takes less than 2 seconds. Comments: No overlying chest wall skin abnormalities. Neurological:      General: No focal deficit present. Mental Status: She is alert. Psychiatric:         Mood and Affect: Mood normal.         Behavior: Behavior normal.          Procedures     MDM   77-year-old female here with persistent chest pain. Lab work and imaging ordered. No acute changes on her EKG initial troponin is 16, repeat is pending. CTA of patient's chest showing no dissection or PE. Patient has abdominal tenderness on the left lower quadrant examination, urinalysis positive for UTI. Treated with Rocephin here. Patient's chest pain nonresolving with nitroglycerin, still persistent. Aspirin given to patient and she will be admitted to [place] [address] with telemetry. EKG: This EKG is signed and interpreted by me. Rate:61  Rhythm: Sinus  Interpretation: Left axis deviation, PVC present, no ST elevations, first-degree AV block  Comparison: Appears similar as compared to EKG from 8/19/2021            --------------------------------------------- PAST HISTORY ---------------------------------------------  Past Medical History:  has a past medical history of Atrial fibrillation ([facility] [address] Compression fracture of L2 ([facility] [address] Hyperlipidemia, Hypertension, and Thyroid disease. Past Surgical History:  has a past surgical history that includes back surgery; Appendectomy; Hysterectomy; Cholecystectomy; Tonsillectomy and adenoidectomy; Breast surgery; and hernia repair. Social History:  reports that she has never smoked. She has never used smokeless tobacco.    Family History: family history is not on file. The patients home medications have been reviewed.     Allergies: Macrodantin [nitrofurantoin] and Codeine    -------------------------------------------------- RESULTS -------------------------------------------------    LABS:  Results for orders placed or performed during the hospital encounter of 12/05/21   CBC Auto Differential   Result Value Ref Range    WBC 10.0 4.5 - 11.5 E9/L    RBC 4.58 3.50 - 5.50 E12/L    Hemoglobin 13.7 11.5 - 15.5 g/dL    Hematocrit 42.6 34.0 - 48.0 %    MCV 93.0 80.0 - 99.9 fL    MCH 29.9 26.0 - 35.0 pg    MCHC 32.2 32.0 - 34.5 %    RDW 14.2 11.5 - 15.0 fL    Platelets 466 610 - 537 E9/L    MPV 10.4 7.0 - 12.0 fL    Neutrophils % 85.5 (H) 43.0 - 80.0 %    Immature Granulocytes % 0.3 0.0 - 5.0 %    Lymphocytes % 6.2 (L) 20.0 - 42.0 %    Monocytes % 6.8 2.0 - 12.0 %    Eosinophils % 0.9 0.0 - 6.0 %    Basophils % 0.3 0.0 - 2.0 %    Neutrophils Absolute 8.53 (H) 1.80 - 7.30 E9/L    Immature Granulocytes # 0.03 E9/L    Lymphocytes Absolute 0.62 (L) 1.50 - 4.00 E9/L    Monocytes Absolute 0.68 0.10 - 0.95 E9/L    Eosinophils Absolute 0.09 0.05 - 0.50 E9/L    Basophils Absolute 0.03 0.00 - 0.20 E9/L   Comprehensive Metabolic Panel w/ Reflex to MG   Result Value Ref Range    Sodium 135 132 - 146 mmol/L    Potassium reflex Magnesium 4.2 3.5 - 5.0 mmol/L    Chloride 99 98 - 107 mmol/L    CO2 25 22 - 29 mmol/L    Anion Gap 11 7 - 16 mmol/L    Glucose 118 (H) 74 - 99 mg/dL    BUN 21 6 - 23 mg/dL    CREATININE 0.9 0.5 - 1.0 mg/dL    GFR Non-African American 59 >=60 mL/min/1.73    GFR African American >60     Calcium 10.0 8.6 - 10.2 mg/dL    Total Protein 7.3 6.4 - 8.3 g/dL    Albumin 4.0 3.5 - 5.2 g/dL    Total Bilirubin 0.4 0.0 - 1.2 mg/dL    Alkaline Phosphatase 116 (H) 35 - 104 U/L    ALT 14 0 - 32 U/L    AST 23 0 - 31 U/L   Troponin   Result Value Ref Range    Troponin, High Sensitivity 16 (H) 0 - 9 ng/L   Lipase   Result Value Ref Range    Lipase 20 13 - 60 U/L   Urinalysis, reflex to microscopic   Result Value Ref Range    Color, UA Yellow Straw/Yellow    Clarity, UA Clear Clear    Glucose, Ur Negative Negative mg/dL    Bilirubin Urine Negative Negative    Ketones, Urine Negative Negative mg/dL    Specific Gravity, UA 1.025 1.005 - 1.030    Blood, Urine SMALL (A) Negative    pH, UA 5.5 5.0 - 9.0    Protein, UA Negative Negative mg/dL    Urobilinogen, Urine 0.2 <2.0 E.U./dL    Nitrite, Urine POSITIVE (A) Negative    Leukocyte Esterase, Urine TRACE (A) Negative   Magnesium   Result Value Ref Range    Magnesium 2.0 1.6 - 2.6 mg/dL   TSH without Reflex   Result Value Ref Range    TSH 1.160 0.270 - 4.200 uIU/mL   Brain Natriuretic Peptide   Result Value Ref Range    Pro- (H) 0 - 450 pg/mL   Microscopic Urinalysis   Result Value Ref Range    WBC, UA 0-1 0 - 5 /HPF    RBC, UA 0-1 0 - 2 /HPF    Epithelial Cells, UA NONE SEEN /HPF    Bacteria, UA RARE (A) None Seen /HPF       RADIOLOGY:  CTA PULMONARY W CONTRAST   Final Result   1. There is no evidence of a pulmonary embolus   2. There are no findings of atypical or COVID pneumonia   3. Emphysematous changes. 4. 4.7 mm pleural based nodule seen within the right upper lobe on axial   image 16. Dedicated follow-up CT scan of the thorax is recommended in 6   months following the [address] [place] [address] recommendations. RECOMMENDATIONS:   Follow-up CT scan is recommended in 6 months. CT ABDOMEN PELVIS W IV CONTRAST Additional Contrast? None   Final Result   1. There is retained stool seen throughout the course of the colon consistent   with constipation   2. There is no intra-abdominal abscess or free air   3. There is no acute intra-abdominal or intrapelvic pathology.    4. Advanced degenerative changes of the lumbar spine.             ------------------------- NURSING NOTES AND VITALS REVIEWED ---------------------------  Date / Time Roomed:  12/5/2021  5:24 AM  ED Bed Assignment:  [id number]    The nursing notes within the ED encounter and vital signs as below have been reviewed. Patient Vitals for the past 24 hrs:   BP Temp Temp src Pulse Resp SpO2 Height Weight   12/05/21 0957 (!) 111/55 -- -- 62 -- -- -- --   12/05/21 0942 (!) 133/57 98.5 °F (36.9 °C) Oral 58 20 96 % -- --   12/05/21 0534 (!) 143/64 97.7 °F (36.5 °C) Infrared 60 20 98 % 5' 3\" (1.6 m) 156 lb (70.8 kg)       Oxygen Saturation Interpretation: Normal    ------------------------------------------ PROGRESS NOTES ------------------------------------------  Re-evaluation(s):  Time: 10am  Patients symptoms show no change  Repeat physical examination is not changed    Counseling:  I have spoken with the patient and discussed todays results, in addition to providing specific details for the plan of care and counseling regarding the diagnosis and prognosis. Their questions are answered at this time and they are agreeable with the plan of admission.    --------------------------------- ADDITIONAL PROVIDER NOTES ---------------------------------  Consultations:  Time: 1030am. Spoke with Dr. Petr De Jesus. Discussed case. They will admit the patient. This patient's ED course included: a personal history and physicial examination, re-evaluation prior to disposition, multiple bedside re-evaluations, IV medications and cardiac monitoring    This patient has remained hemodynamically stable during their ED course. Diagnosis:  1. Acute chest pain    2. Acute UTI    3. Lung nodule        Disposition:  Patient's disposition: Admit to med/surg floor with tele  Patient's condition is stable. Debbie Guerin DO  Resident  12/05/21 1042      ATTENDING PROVIDER ATTESTATION:     I have personally performed and/or participated in the history, exam, medical decision making, and procedures and agree with all pertinent clinical information. I have also reviewed and agree with the past medical, family and social history unless otherwise noted.     I have discussed this patient in detail with the resident, and provided the instruction and education regarding chest pain, shortness of breath, acute coronary syndrome and pulmonary embolism. My findings/Plan: Bradycardic. Lungs CTA bilaterally. Abdomen soft, nontender. Bowel sounds normal. Supportive care. Admit for further evaluation and treatment.          [facility],   01/07/22 2608

## 2021-12-05 NOTE — H&P
H&P Note        CHIEF COMPLAINT: Chest pain      HISTORY OF PRESENT ILLNESS:      The patient is a 80 y.o. female who presents with the above. Reported that the pain is mainly pressure at the midsternal area up to her throat and jaw there is no acid reflux associated with it okay had while lying flat in bed patient is known to have history of hiatal hernia that has been repaired by surgery and was doing very well denied nausea or vomiting or dysphagia. Patient has multiple risk factors for heart disease including hypertension hyperlipidemia obesity she also have history of atrial fibrillation. Patient is admitted to the hospital because of atypical chest pain cardiac enzymes so far are negative and patient will get a stress test tomorrow as ordered by cardiology. Also ordered a GI cocktail as symptoms might be due to GI cause rather than cardiac   History was taken from the patient with son at bedside    Past Medical History:    Past Medical History:   Diagnosis Date    Atrial fibrillation ([address]     Compression fracture of L2 ([address]     Hyperlipidemia     Hypertension     Thyroid disease        Past Surgical History:    Past Surgical History:   Procedure Laterality Date    APPENDECTOMY      BACK SURGERY      BREAST SURGERY      CHOLECYSTECTOMY      HERNIA REPAIR      HYSTERECTOMY      TONSILLECTOMY AND ADENOIDECTOMY         Medications Prior to Admission:    Prior to Admission medications    Medication Sig Start Date End Date Taking? Authorizing Provider   atorvastatin (LIPITOR) 20 MG tablet Take by mouth 4/1/09  Yes Historical Provider, MD   Vitamin D, Cholecalciferol, 1000 units TABS Take by mouth 8/25/09  Yes Historical Provider, MD   citalopram (CELEXA) 20 MG tablet Take 20 mg by mouth   Yes Historical Provider, MD   cyanocobalamin 500 MCG tablet Take by mouth   Yes Historical Provider, MD   gabapentin (NEURONTIN) 600 MG tablet Take 300 mg by mouth.   11/8/07  Yes Historical Provider, MD levothyroxine (SYNTHROID) 50 MCG tablet Take 50 mcg by mouth 11/17/11  Yes Historical Provider, MD   levETIRAcetam (KEPPRA) 500 MG tablet Take 1 tablet by mouth 2 times daily for 5 days 8/20/21 8/25/21  Mahad Odom MD   metoprolol succinate (TOPROL XL) 25 MG extended release tablet Take by mouth 11/17/11   Historical Provider, MD       Allergies:    Macrodantin [nitrofurantoin] and Codeine    Social History:   Social History     Socioeconomic History    Marital status:      Spouse name: Not on file    Number of children: Not on file    Years of education: Not on file    Highest education level: Not on file   Occupational History    Not on file   Tobacco Use    Smoking status: Never Smoker    Smokeless tobacco: Never Used   Substance and Sexual Activity    Alcohol use: Not on file    Drug use: Not on file    Sexual activity: Not on file   Other Topics Concern    Not on file   Social History Narrative    Not on file     Social Determinants of Health     Financial Resource Strain:     Difficulty of Paying Living Expenses: Not on file   Food Insecurity:     Worried About Running Out of Food in the Last Year: Not on file    Lea of Food in the Last Year: Not on file   Transportation Needs:     Lack of Transportation (Medical): Not on file    Lack of Transportation (Non-Medical):  Not on file   Physical Activity:     Days of Exercise per Week: Not on file    Minutes of Exercise per Session: Not on file   Stress:     Feeling of Stress : Not on file   Social Connections:     Frequency of Communication with Friends and Family: Not on file    Frequency of Social Gatherings with Friends and Family: Not on file    Attends Voodoo Services: Not on file    Active Member of Clubs or Organizations: Not on file    Attends Club or Organization Meetings: Not on file    Marital Status: Not on file   Intimate Partner Violence:     Fear of Current or Ex-Partner: Not on file   Bora Emotionally Abused: Not on file    Physically Abused: Not on file    Sexually Abused: Not on file   Housing Stability:     Unable to Pay for Housing in the Last Year: Not on file    Number of Places Lived in the Last Year: Not on file    Unstable Housing in the Last Year: Not on file       Family History:   History reviewed. No pertinent family history. REVIEW OF SYSTEMS:    General:  No fever or chills. No lightheadedness or dizziness  Cardiovascular: As above  Respiratory: Denies shortness of breath, coughing, sputum production, hemoptysis, or wheezing. Gastrointestinal: Denies nausea, vomiting, diarrhea, or constipation. Denies any   abdominal pain. Extremities: Denies any lower extremity swelling or edema. Neurology:  Denies any headache or focal neurological deficits. No weakness or   paresthesia. Derm:  Denies any rashes, ulcers, or excoriations. Denies bruising. Genitourinary:  Denies any urgency, frequency, hematuria. Voiding without difficulty. Musculoskeletal:  Denies any myalgia or arthralgia. No back pain. PHYSICAL EXAM:    Vitals:  BP (!) 141/63   Pulse 60   Temp 98.2 °F (36.8 °C) (Oral)   Resp 16   Ht 5' 3\" (1.6 m)   Wt 156 lb (70.8 kg)   SpO2 95%   BMI 27.63 kg/m²     General:  This is a 80 y.o. yo female who is alert and oriented in NAD  HEENT:  Head is normocephalic and atraumatic, PERRLA, EOMI, mucus membranes moist with no pharyngeal erythema or exudate. Neck:  Supple with no carotid bruits, JVD or thyromegaly.   No cervical adenopathy  CV:  Regular rate and rhythm, no murmurs  Lungs:  Clear to auscultation bilaterally with no wheezes, rales or rhonchi  Abdomen:  Soft, nontender, nondistended, bowel sounds present  Extremities:  No edema, peripheral pulses intact bilaterally  Neuro:  Cranial nerves II-XII grossly intact; motor and sensory function intact with no focal deficits  Skin:  No rashes, lesions or wounds    CBC with Differential:    Lab Results   Component lobe.  The spleen, pancreas, adrenal glands and kidneys are   unremarkable. GI/Bowel: The gallbladder is surgically absent.  The stomach is normally   distended.  There is no small bowel distention. There is retained stool seen throughout the colon consistent with   constipation.  There are no findings of inflammatory bowel disease. There are no right lower quadrant inflammatory changes. Pelvis:  Bladder is unremarkable in appearance.  There is no pelvic   inflammatory process there are very small bilateral inguinal hernias   containing mesenteric fat only. Peritoneum/Retroperitoneum: The abdominal aorta is normal in caliber.  There   is no aneurysm or dissection.  No evidence of retroperitoneal lymphadenopathy. Bones/Soft Tissues:  Age related degenerative changes of the visualized   osseous structures without focal destructive lesion. Stacy Many are old   compression fractures at the L1 and L2 levels. .      Impression:       1. There is retained stool seen throughout the course of the colon consistent   with constipation   2. There is no intra-abdominal abscess or free air   3. There is no acute intra-abdominal or intrapelvic pathology. 4. Advanced degenerative changes of the lumbar spine.      CTA PULMONARY W CONTRAST [[id number]] Collected: 12/05/21 0848     Order Status: Completed Updated: 12/05/21 0856     Narrative:       EXAMINATION:   CTA OF THE CHEST 12/5/2021 8:34 am     TECHNIQUE:   CTA of the chest was performed after the administration of intravenous   contrast.  Multiplanar reformatted images are provided for review.  MIP   images are provided for review. Dose modulation, iterative reconstruction,   and/or weight based adjustment of the mA/kV was utilized to reduce the   radiation dose to as low as reasonably achievable. COMPARISON:   None.      HISTORY:   ORDERING SYSTEM PROVIDED HISTORY: r/o PE   TECHNOLOGIST PROVIDED HISTORY:   Reason for exam:->r/o PE   Decision Support Exception - unselect if not a suspected or confirmed   emergency medical condition->Emergency Medical Condition (MA)     FINDINGS:   Pulmonary Arteries: Pulmonary arteries are adequately opacified for   evaluation.  No evidence of intraluminal filling defect to suggest pulmonary   embolism.  Main pulmonary artery is normal in caliber. Mediastinum: No evidence of mediastinal lymphadenopathy.  The heart and   pericardium demonstrate no acute abnormality.  There is no significant plaque   seen within the coronary arteries.  The heart is at upper limits of normal in   size.  There is no pericardial effusion. Linwood Duff is no acute abnormality of   the thoracic aorta. Lungs/pleura: The lungs are without acute process.  No focal consolidation or   pulmonary edema.  No evidence of pleural effusion or pneumothorax.  Mild   emphysematous changes are noted. Linwood Duff is a 5 mm pleural based nodule seen   within the right upper lobe posteriorly on axial image number 16.  The   pleural-based nodule measures 4.7 mm. Upper Abdomen: Limited images of the upper abdomen are unremarkable. Soft Tissues/Bones: No acute bone or soft tissue abnormality.      Impression:       1. There is no evidence of a pulmonary embolus   2. There are no findings of atypical or COVID pneumonia   3. Emphysematous changes. 4. 4.7 mm pleural based nodule seen within the right upper lobe on axial   image 16.  Dedicated follow-up CT scan of the thorax is recommended in 6   months following the [address] recommendations. RECOMMENDATIONS:   Follow-up CT scan is recommended in 6 months.              ASSESSMENT:    SDH (subdural hematoma) (HCC)    Mixed hyperlipidemia    Hypothyroidism    Diabetes mellitus without complication (HCC)    Coronary atherosclerosis of native coronary artery    Atrial fibrillation (HCC)    Acute chest pain     4.7 mm pulmonary nodule    History of syncopal episode.   Loop in place     PLAN:  As in order  Will monitor the pulmonary nodule and repeat CAT scan of the chest in about 6 months      Electronically signed by Charly Ham MD on 12/5/2021 at 2:20 PM

## 2021-12-05 NOTE — CONSULTS
CHIEF COMPLAINT:    HISTORY OF PRESENT ILLNESS: Patient is a 80 y.o. female seen at the request of Laure Cramer MD.  She is new to our service. She follows with Dr. Jany Ayers at [facility]. She has a history of coronary artery disease status post MI in the past with an unsuccessful attempted intervention. This was in 2005 and records not available. Paroxysmal atrial fibrillation on Eliquis at home, frequent syncopal episodes status post loop recorder implantation with unclear etiology, recent subdural hematoma, hypertension, hyperlipidemia. She was in her normal state of health this morning when she was at [employer]. She had sudden onset, central, substernal chest pain that was radiating through the jaw and neck. She decided to come to the emergency room. She was given nitroglycerin which did not help. The pain subsided on its own. She does have heartburn and states that the pain is different from heartburn. The pain was worsened on deep inspiration as well as on palpation. She does not have any exertional chest pain. She has not had any prior instances of chest pain. This morning, she had another episode. She was given nitroglycerin x2. EKG was done which is summarized below.       Past Medical History:   Diagnosis Date    Atrial fibrillation ([address]     Compression fracture of L2 ([address]     Hyperlipidemia     Hypertension     Thyroid disease        Patient Active Problem List   Diagnosis    SDH (subdural hematoma) (HCC)    Syncope    Acute chest pain    Atrial fibrillation ([address]    Coronary atherosclerosis of native coronary artery    Diabetes mellitus without complication (HCC)    Hypothyroidism    Mixed hyperlipidemia    Pulmonary nodule       Allergies   Allergen Reactions    Macrodantin [Nitrofurantoin] Other (See Comments)     Causes high fever     Codeine Nausea And Vomiting       Current Facility-Administered Medications   Medication Dose Route Frequency Provider Last Rate Last Admin    atorvastatin (LIPITOR) tablet 20 mg  20 mg Oral Nightly Natalie Barcenas MD        citalopram (CELEXA) tablet 20 mg  20 mg Oral Daily Benji Perkins MD   20 mg at 12/05/21 1338    vitamin B-12 (CYANOCOBALAMIN) tablet 500 mcg  500 mcg Oral Daily Benji Perkins MD   500 mcg at 12/05/21 1338    gabapentin (NEURONTIN) capsule 300 mg  300 mg Oral Nightly Natalie Barcenas MD        [START ON 12/6/2021] levothyroxine (SYNTHROID) tablet 50 mcg  50 mcg Oral Daily Benji Perkins MD        Vitamin D (CHOLECALCIFEROL) tablet 1,000 Units  1,000 Units Oral Daily Benji Perkins MD   1,000 Units at 12/05/21 1337    metoprolol succinate (TOPROL XL) extended release tablet 12.5 mg  12.5 mg Oral TID PRN Benji Perkins MD        Or    metoprolol succinate (TOPROL XL) extended release tablet 25 mg  25 mg Oral TID PRN Natalie Barcenas MD        psyllium (KONSYL) 28.3 % packet 1 packet  1 packet Oral Daily Natalie Barcenas MD        nitroGLYCERIN (NITROSTAT) SL tablet 0.4 mg  0.4 mg SubLINGual Q5 Min PRN Fly Slaughter MD           Social History     Socioeconomic History    Marital status:      Spouse name: Not on file    Number of children: Not on file    Years of education: Not on file    Highest education level: Not on file   Occupational History    Not on file   Tobacco Use    Smoking status: Never Smoker    Smokeless tobacco: Never Used   Substance and Sexual Activity    Alcohol use: Not on file    Drug use: Not on file    Sexual activity: Not on file   Other Topics Concern    Not on file   Social History Narrative    Not on file     Social Determinants of Health     Financial Resource Strain:     Difficulty of Paying Living Expenses: Not on file   Food Insecurity:     Worried About Running Out of Food in the Last Year: Not on file    Lea of Food in the Last Year: Not on file   Transportation Needs:     Lack of Transportation (Medical):  Not on file    Lack of Transportation (Non-Medical): Not on file   Physical Activity:     Days of Exercise per Week: Not on file    Minutes of Exercise per Session: Not on file   Stress:     Feeling of Stress : Not on file   Social Connections:     Frequency of Communication with Friends and Family: Not on file    Frequency of Social Gatherings with Friends and Family: Not on file    Attends Restorationist Services: Not on file    Active Member of [address] [place] [address] or Organizations: Not on file    Attends Club or Organization Meetings: Not on file    Marital Status: Not on file   Intimate Partner Violence:     Fear of Current or Ex-Partner: Not on file    Emotionally Abused: Not on file    Physically Abused: Not on file    Sexually Abused: Not on file   Housing Stability:     Unable to Pay for Housing in the Last Year: Not on file    Number of [place] in the Last Year: Not on file    Unstable Housing in the Last Year: Not on file       History reviewed. No pertinent family history. Review of Systems:   Heart: as above   Lungs: as above   Eyes: denies changes in vision or discharge. Ears: denies changes in hearing or pain. Nose: denies epistaxis or masses   Throat: denies sore throat or trouble swallowing. Neuro: denies numbness, tingling, tremors. Skin: denies rashes or itching. : denies hematuria, dysuria   GI: denies vomiting, diarrhea   Psych: denies mood changed, anxiety, depression. all others negative. Physical Exam   BP (!) 141/63   Pulse 60   Temp 98.2 °F (36.8 °C) (Oral)   Resp 16   Ht 5' 3\" (1.6 m)   Wt 156 lb (70.8 kg)   SpO2 95%   BMI 27.63 kg/m²   Constitutional: Oriented to person, place, and time. Well-developed and well-nourished. No distress. Head: Normocephalic and atraumatic. Eyes: EOM are normal. Pupils are equal, round, and reactive to light. Neck: Normal range of motion. Neck supple. No hepatojugular reflux and no JVD present. Carotid bruit is not present.  No tracheal deviation present. No thyromegaly present. Cardiovascular: Normal rate, regular rhythm, normal heart sounds and intact distal pulses. Exam reveals no gallop and no friction rub. No murmur heard. Chest wall tenderness present. Pulmonary/Chest: Effort normal and breath sounds normal. No respiratory distress. No wheezes. No rales. No tenderness. Abdominal: Soft. Bowel sounds are normal. No distension and no mass. No tenderness. No rebound and no guarding. Musculoskeletal: Normal range of motion. No edema and no tenderness. Lymphadenopathy:   No cervical adenopathy. No groin adenopathy. Neurological: Alert and oriented to person, place, and time. Skin: Skin is warm and dry. No rash noted. Not diaphoretic. No erythema. Psychiatric: Normal mood and affect. Behavior is normal.     CBC:   Lab Results   Component Value Date    WBC 10.0 12/05/2021    RBC 4.58 12/05/2021    HGB 13.7 12/05/2021    HCT 42.6 12/05/2021    MCV 93.0 12/05/2021    MCH 29.9 12/05/2021    MCHC 32.2 12/05/2021    RDW 14.2 12/05/2021     12/05/2021    MPV 10.4 12/05/2021     BMP:   Lab Results   Component Value Date     12/05/2021    K 4.2 12/05/2021    CL 99 12/05/2021    CO2 25 12/05/2021    BUN 21 12/05/2021    LABALBU 4.0 12/05/2021    CREATININE 0.9 12/05/2021    CALCIUM 10.0 12/05/2021    GFRAA >60 12/05/2021    LABGLOM 59 12/05/2021     Magnesium:    Lab Results   Component Value Date    MG 2.0 12/05/2021     Cardiac Enzymes:   Lab Results   Component Value Date    TROPHS 16 (H) 12/05/2021    TROPHS 20 (H) 08/20/2021    TROPHS 18 (H) 08/19/2021      PT/INR:  No results found for: PROTIME, INR  TSH:    Lab Results   Component Value Date    TSH 1.160 12/05/2021       Rhythm Strip: Shows normal sinus rhythm. EKG: Sinus bradycardia. First-degree AV delay. Left axis deviation. Poor R wave progression. Prior cardiac workup:  Echocardiogram from 9/10/2021: At [facility]. EF is 65%. Normal RV size and function.   Normal PA pressure. ZioPatch 06/04/2021-06/18/2021 - Predominant sinus rhythm  pbm, avg 59 bpm. 10 atrial/supraventricular tachycardia/atrial fibrillation runs, the fastest and longest 1 min 37 sec long with a max rate of 235 bpm, avg 177 bpm with some periods of regularity and atrial fibrillation. Accelerated junctional rhythm 77-86 bpm noted associated with patient triggered event/diary entry. Occasional (1.3%) SVEs, rare (<1.0%) SVE couplets and triplets. Rare (<1.0%) VE singles, couplets and triplets. 4 patient triggered events and diary entries (lightheaded) correlating with sinus rhythm  bpm, accelerated junctional rhythm 77-86 bpm, and SVEs. Tilt test 3/28/2018  - Overall: The test is negative for syncope. Overall stable blood pressures and heart   rates were seen during upright tilt. Assessment:  History of coronary artery disease, atypical angina. Paroxysmal atrial fibrillation. History of subdural hematoma. Frequent recurrent syncopal episodes status post loop recorder implantation. Hyperlipidemia. Hypertension. Recommendations:  Her pain features are  atypical.  It appears to be musculoskeletal in nature. Will try dose of GI cocktail as well as supportive measures. She does have some risk factors, however. Thus, I will schedule her for a nuclear perfusion scan tomorrow. Continue home Lipitor and Eliquis in the meantime. She takes her Toprol-XL as needed for episodes of palpitations. I will also try addition of Imdur 30 mg daily. Thank you very much for allowing me to participate in the care of your patient! I will continue to follow her along with you. Winnie Jim MD  [facility]) Cardiology     NOTE: This report was transcribed using voice recognition software. Every effort was made to ensure accuracy; however, inadvertent computerized transcription errors may be present.

## 2021-12-06 ENCOUNTER — APPOINTMENT (OUTPATIENT)
Dept: NUCLEAR MEDICINE | Age: 86
End: 2021-12-06
Payer: MEDICARE

## 2021-12-06 ENCOUNTER — APPOINTMENT (OUTPATIENT)
Dept: NON INVASIVE DIAGNOSTICS | Age: 86
End: 2021-12-06
Payer: MEDICARE

## 2021-12-06 VITALS
DIASTOLIC BLOOD PRESSURE: 54 MMHG | HEIGHT: 63 IN | HEART RATE: 51 BPM | TEMPERATURE: 97.9 F | RESPIRATION RATE: 16 BRPM | BODY MASS INDEX: 27.64 KG/M2 | OXYGEN SATURATION: 95 % | SYSTOLIC BLOOD PRESSURE: 106 MMHG | WEIGHT: 156 LBS

## 2021-12-06 PROBLEM — R07.89 OTHER CHEST PAIN: Status: ACTIVE | Noted: 2021-12-05

## 2021-12-06 LAB
EKG ATRIAL RATE: 59 BPM
EKG ATRIAL RATE: 61 BPM
EKG P AXIS: 45 DEGREES
EKG P AXIS: 67 DEGREES
EKG P-R INTERVAL: 204 MS
EKG P-R INTERVAL: 204 MS
EKG Q-T INTERVAL: 444 MS
EKG Q-T INTERVAL: 452 MS
EKG QRS DURATION: 104 MS
EKG QRS DURATION: 104 MS
EKG QTC CALCULATION (BAZETT): 446 MS
EKG QTC CALCULATION (BAZETT): 447 MS
EKG R AXIS: -46 DEGREES
EKG R AXIS: -57 DEGREES
EKG T AXIS: 26 DEGREES
EKG T AXIS: 60 DEGREES
EKG VENTRICULAR RATE: 59 BPM
EKG VENTRICULAR RATE: 61 BPM
LV EF: 60 %
LVEF MODALITY: NORMAL
TROPONIN, HIGH SENSITIVITY: 23 NG/L (ref 0–9)

## 2021-12-06 PROCEDURE — 93018 CV STRESS TEST I&R ONLY: CPT | Performed by: INTERNAL MEDICINE

## 2021-12-06 PROCEDURE — G0378 HOSPITAL OBSERVATION PER HR: HCPCS

## 2021-12-06 PROCEDURE — 78452 HT MUSCLE IMAGE SPECT MULT: CPT | Performed by: INTERNAL MEDICINE

## 2021-12-06 PROCEDURE — 6360000002 HC RX W HCPCS: Performed by: INTERNAL MEDICINE

## 2021-12-06 PROCEDURE — 3430000000 HC RX DIAGNOSTIC RADIOPHARMACEUTICAL: Performed by: RADIOLOGY

## 2021-12-06 PROCEDURE — 78452 HT MUSCLE IMAGE SPECT MULT: CPT

## 2021-12-06 PROCEDURE — 93010 ELECTROCARDIOGRAM REPORT: CPT | Performed by: INTERNAL MEDICINE

## 2021-12-06 PROCEDURE — 93017 CV STRESS TEST TRACING ONLY: CPT

## 2021-12-06 PROCEDURE — A9500 TC99M SESTAMIBI: HCPCS | Performed by: RADIOLOGY

## 2021-12-06 PROCEDURE — 36415 COLL VENOUS BLD VENIPUNCTURE: CPT

## 2021-12-06 PROCEDURE — 84484 ASSAY OF TROPONIN QUANT: CPT

## 2021-12-06 PROCEDURE — 6370000000 HC RX 637 (ALT 250 FOR IP): Performed by: INTERNAL MEDICINE

## 2021-12-06 PROCEDURE — 93016 CV STRESS TEST SUPVJ ONLY: CPT | Performed by: INTERNAL MEDICINE

## 2021-12-06 RX ORDER — ATORVASTATIN CALCIUM 20 MG/1
20 TABLET, FILM COATED ORAL NIGHTLY
Qty: 30 TABLET | Refills: 3 | Status: SHIPPED | OUTPATIENT
Start: 2021-12-06 | End: 2022-10-26 | Stop reason: SDUPTHER

## 2021-12-06 RX ORDER — METOPROLOL SUCCINATE 25 MG/1
12.5 TABLET, EXTENDED RELEASE ORAL 3 TIMES DAILY PRN
Qty: 30 TABLET | Refills: 3 | Status: SHIPPED | OUTPATIENT
Start: 2021-12-06 | End: 2022-10-26 | Stop reason: SDUPTHER

## 2021-12-06 RX ADMIN — CITALOPRAM HYDROBROMIDE 20 MG: 20 TABLET ORAL at 10:19

## 2021-12-06 RX ADMIN — PSYLLIUM HUSK 1 PACKET: 3.4 GRANULE ORAL at 10:19

## 2021-12-06 RX ADMIN — Medication 10 MILLICURIE: at 07:41

## 2021-12-06 RX ADMIN — Medication 1000 UNITS: at 10:19

## 2021-12-06 RX ADMIN — APIXABAN 5 MG: 5 TABLET, FILM COATED ORAL at 10:19

## 2021-12-06 RX ADMIN — CYANOCOBALAMIN TAB 1000 MCG 500 MCG: 1000 TAB at 10:19

## 2021-12-06 RX ADMIN — REGADENOSON 0.4 MG: 0.08 INJECTION, SOLUTION INTRAVENOUS at 09:43

## 2021-12-06 RX ADMIN — Medication 30 MILLICURIE: at 09:42

## 2021-12-06 ASSESSMENT — PAIN DESCRIPTION - PROGRESSION: CLINICAL_PROGRESSION: NOT CHANGED

## 2021-12-06 ASSESSMENT — PAIN SCALES - GENERAL: PAINLEVEL_OUTOF10: 0

## 2021-12-06 NOTE — PROGRESS NOTES
[facility]) Physicians        CARDIOLOGY                 INPATIENT PROGRESS NOTE          PATIENT SEEN IN FOLLOW UP FOR:  Chest pain    Hospital Day: 2     Helena Pallas is a 80 year old patient known to Dr. Kateryna Severino at [place] [address] by Sole Cruz this admission      SUBJECTIVE: Denies any CP or SOB - No orthopnea, No palpitations - Seen in the stress lab area. ROS: Review of rest of 8 systems negative except as mentioned above    OBJECTIVE: No acute distress. See Assessment     Diagnostics:       Telemetry:  Reviewed         No intake or output data in the 24 hours ending 12/06/21 0841    Labs:   CBC: Recent Labs     12/05/21  0625   WBC 10.0   HGB 13.7   HCT 42.6        BMP:   Recent Labs     12/05/21  0625      K 4.2   CO2 25   BUN 21   CREATININE 0.9   LABGLOM 59   CALCIUM 10.0     Mag:   Recent Labs     12/05/21  0625   MG 2.0     Phos: No results for input(s): PHOS in the last 72 hours. TSH:   Recent Labs     12/05/21  0625   TSH 1.160     HgA1c:     BNP: No results for input(s): BNP in the last 72 hours. PT/INR: No results for input(s): PROTIME, INR in the last 72 hours. APTT:No results for input(s): APTT in the last 72 hours. CARDIAC ENZYMES:No results for input(s): CKTOTAL, CKMB, CKMBINDEX, TROPONINI in the last 72 hours.   FASTING LIPID PANEL:No results found for: CHOL, HDL, LDLDIRECT, LDLCALC, TRIG  LIVER PROFILE:  Recent Labs     12/05/21  0625   AST 23   ALT 14   LABALBU 4.0       Current Inpatient Medications:   atorvastatin  20 mg Oral Nightly    citalopram  20 mg Oral Daily    cyanocobalamin  500 mcg Oral Daily    gabapentin  300 mg Oral Nightly    levothyroxine  50 mcg Oral Daily    Vitamin D  1,000 Units Oral Daily    psyllium  1 packet Oral Daily    apixaban  5 mg Oral BID    morphine  1 mg IntraVENous Once       IV Infusions (if any):        PHYSICAL EXAM:     CONSTITUTIONAL:   BP (!) 106/54   Pulse 51   Temp 97.9 °F (36.6 °C) (Oral)   Resp 16 Ht 5' 3\" (1.6 m)   Wt 156 lb (70.8 kg)   SpO2 95%   BMI 27.63 kg/m²   Pulse  Av  Min: 46  Max: 92  Systolic (29FZV), XNL:718 , Min:106 , DWE:985    Diastolic (30EHZ), FGN:78, Min:54, Max:66    In general, this is a well developed, well nourished who appears stated age. awake, alert, no apparent distress  HEENT: eyes -conjunctivae pink,  Throat - Oral mucosa moist.   Neck-  no stridor, no carotid bruit. no jugular venous distention   RESPIRATORY: Chest symmetrical. +ve loop recorder. No accessory muscle use. Lung auscultation - few rhonchi  CARDIOVASCULAR:     Heart Palpation - no palpable thrills   Heart Ausculation - Regular rate and rhythm, 2/6 systolic murmur, No s3 or rub. No lower extremity edema, no varicosities. Distal pulses palpable, no clubbing or cyanosis   ABDOMEN: Soft, Bowel sounds present. MS: n/a. : Deferred  Rectal Exam: Deferred  SKIN: warm and dry   NEURO / PSYCH: oriented to person, place        ASSESSMENT/PLAN:     Chest pain - MI rueld out, No further CP. Imdur added; Lexiscan stress today. Sinus bradycardia - asymptomatic, s/p Loop recorder implant few months ago. Monitor HR and avoid AV blocking medications    Hx of syncope -  Fall and SDH; Tilt table test and s/p Loop recorder implant at [facility] few months ago. History of coronary artery disease - Cath in ?-Medical therapy recommended; Not on ASA due to Eliquis, Not on BB due to bradycardia; Continue Statin    Paroxysmal Atrial fibrillation (HCC) - On Eliquis for [address]. Hypertension - Controlled     Acquired Hypothyroidism - On HRT              If stress test nonischemic, home later today    F/u by Dr Kateryna Severino, at [facility] after discharge.       Above recommendations discussed with her.       Electronically signed by Ramona Taveras MD on 2021 at 8:41 AM  [facility]) Cardiology

## 2021-12-06 NOTE — CARE COORDINATION
12/6/2021 1058 CM note: No covid testing. Met with patient for transition of care needs. Pt relays, her son resides with her in a 1 floor condo, 1 step entry. She is independent with ADLs  and drives. PCP is Dr Evens Valentine and uses [facility]. [facility]. Pt plans to return home at [place], her son will provide ride. Pt for stress test today-await results.  Bere SEGURA

## 2021-12-06 NOTE — PROGRESS NOTES
Stress test results d/w her and her son, at bed side. Medical Rx vs. Cardiac cath discussed    She wants to go home and f/u with her Cardioogist, Dr Nan Pratt at [facility] [facility] showed small defect, EF normal, she has no further  chest pain, started on Imdur yesterday.     Ok to discharge home   She was advised to call Dr Christine Sánchez for chest pain or syncope     Gregory Redmond MD  12/6/2021  2:39 PM

## 2021-12-06 NOTE — DISCHARGE SUMMARY
Physician Discharge Summary     Patient ID:  John Jeter  [id number]  40 y.o.  1934    Admit date: 12/5/2021    Discharge date and time: 12 /6/2021    Admission Diagnoses: Active Problems:    SDH (subdural hematoma) (HCC)    Other chest pain    Atrial fibrillation (HCC)    Coronary atherosclerosis of native coronary artery    Diabetes mellitus without complication (HCC)    Hypothyroidism    Mixed hyperlipidemia    Pulmonary nodule  Resolved Problems:    * No resolved hospital problems. *      Discharge Diagnoses:   Abnormal stress test positive for inferior ischemia  Otherwise as an admit diagnosis    Consults: cardiology    Procedures: nuclear stress test    Hospital Course: patient has multiple risk factors for coronary artery disease was admitted to the hospital because of chest pain. Consultation with cardiology. Cardiac enzymes showed no evidence of acute myocardial injury. Stress test was done showed inferior ischemia.   Recently detected to have the rest of her workup done at [facility] patient is discharged home to follow-up with the [facility] ASAP    BP (!) 106/54   Pulse 51   Temp 97.9 °F (36.6 °C) (Oral)   Resp 16   Ht 5' 3\" (1.6 m)   Wt 156 lb (70.8 kg)   SpO2 95%   BMI 27.63 kg/m²   Head and Neck: normal atraumatic, no neck masses, normal thyroid, no jvd  Heart:  regular rate and rhythm, S1, S2 normal, no murmur, click, rub or gallop  Lungs:  chest clear, no wheezing, rales, normal symmetric air entry,  no chest wall deformities or tenderness  Abd: soft, non-tender, without masses or organomegaly  Extrem:  No clubbing, cyanosis, or edema  Neuro:Normal, no focal neurological deficit     Condition on discharge: stable    Disposition: home    Patient Instructions:   Current Discharge Medication List      START taking these medications    Details   apixaban (ELIQUIS) 5 MG TABS tablet Take 1 tablet by mouth 2 times daily  Qty: 60 tablet, Refills: 0         CONTINUE these medications which have CHANGED    Details   metoprolol succinate (TOPROL XL) 25 MG extended release tablet Take 0.5 tablets by mouth 3 times daily as needed (palpitation)  Qty: 30 tablet, Refills: 3      atorvastatin (LIPITOR) 20 MG tablet Take 1 tablet by mouth nightly  Qty: 30 tablet, Refills: 3         CONTINUE these medications which have NOT CHANGED    Details   Vitamin D, Cholecalciferol, 1000 units TABS Take by mouth      citalopram (CELEXA) 20 MG tablet Take 20 mg by mouth      cyanocobalamin 500 MCG tablet Take by mouth      gabapentin (NEURONTIN) 600 MG tablet Take 300 mg by mouth.       levothyroxine (SYNTHROID) 50 MCG tablet Take 50 mcg by mouth      levETIRAcetam (KEPPRA) 500 MG tablet Take 1 tablet by mouth 2 times daily for 5 days  Qty: 10 tablet, Refills: 0           Activity: activity as tolerated  Diet: cardiac diet    Follow-up with PCP in 1 week.     Note that over 30 minutes was spent in preparing discharge papers, discussing discharge with patient, medication review, etc.      Electronically signed by David Eubanks MD on 12/6/2021 at 5:39 PM

## 2021-12-06 NOTE — PROGRESS NOTES
Lexiscan Stress Test:    Reason for study: Chest pain    Resting EKG showed sinus bradycardia. Old Anterior MI    Exam:  Heart - regular, Lungs- clear    Patient received 0.4mg Lexiscan per protocol    Symptoms: No chest pain, No short of breath; Mild Nausea, resolved    EKG:  No ischemia or arrhythmia during Lexiscan infusion. Maximal heart rate 73        Peak /94 mmHg       Post test complications: None      SPECT image report pending.     Electronically signed by Nima Day MD on 12/6/2021 at 10:22 AM

## 2022-04-04 LAB
ALBUMIN SERPL-MCNC: NORMAL G/DL
ALP BLD-CCNC: NORMAL U/L
ALT SERPL-CCNC: NORMAL U/L
ANION GAP SERPL CALCULATED.3IONS-SCNC: NORMAL MMOL/L
AST SERPL-CCNC: NORMAL U/L
BASOPHILS ABSOLUTE: NORMAL
BASOPHILS RELATIVE PERCENT: NORMAL
BILIRUB SERPL-MCNC: NORMAL MG/DL
BUN BLDV-MCNC: NORMAL MG/DL
CALCIUM SERPL-MCNC: NORMAL MG/DL
CHLORIDE BLD-SCNC: NORMAL MMOL/L
CHOLESTEROL, TOTAL: NORMAL
CHOLESTEROL/HDL RATIO: NORMAL
CO2: NORMAL
CREAT SERPL-MCNC: NORMAL MG/DL
EOSINOPHILS ABSOLUTE: NORMAL
EOSINOPHILS RELATIVE PERCENT: NORMAL
GFR CALCULATED: NORMAL
GLUCOSE BLD-MCNC: NORMAL MG/DL
HCT VFR BLD CALC: NORMAL %
HDLC SERPL-MCNC: NORMAL MG/DL
HEMOGLOBIN: NORMAL
LDL CHOLESTEROL CALCULATED: NORMAL
LYMPHOCYTES ABSOLUTE: NORMAL
LYMPHOCYTES RELATIVE PERCENT: NORMAL
MCH RBC QN AUTO: NORMAL PG
MCHC RBC AUTO-ENTMCNC: NORMAL G/DL
MCV RBC AUTO: NORMAL FL
MONOCYTES ABSOLUTE: NORMAL
MONOCYTES RELATIVE PERCENT: NORMAL
NEUTROPHILS ABSOLUTE: NORMAL
NEUTROPHILS RELATIVE PERCENT: NORMAL
NONHDLC SERPL-MCNC: NORMAL MG/DL
PDW BLD-RTO: NORMAL %
PLATELET # BLD: NORMAL 10*3/UL
PMV BLD AUTO: NORMAL FL
POTASSIUM SERPL-SCNC: NORMAL MMOL/L
RBC # BLD: NORMAL 10*6/UL
SODIUM BLD-SCNC: NORMAL MMOL/L
TOTAL PROTEIN: NORMAL
TRIGL SERPL-MCNC: NORMAL MG/DL
VLDLC SERPL CALC-MCNC: NORMAL MG/DL
WBC # BLD: NORMAL 10*3/UL

## 2022-09-21 ENCOUNTER — OFFICE VISIT (OUTPATIENT)
Dept: FAMILY MEDICINE CLINIC | Age: 87
End: 2022-09-21
Payer: MEDICARE

## 2022-09-21 VITALS
WEIGHT: 156 LBS | HEART RATE: 87 BPM | RESPIRATION RATE: 18 BRPM | HEIGHT: 63 IN | TEMPERATURE: 98 F | BODY MASS INDEX: 27.64 KG/M2 | DIASTOLIC BLOOD PRESSURE: 87 MMHG | SYSTOLIC BLOOD PRESSURE: 132 MMHG | OXYGEN SATURATION: 96 %

## 2022-09-21 DIAGNOSIS — N39.0 URINARY TRACT INFECTION WITHOUT HEMATURIA, SITE UNSPECIFIED: Primary | ICD-10-CM

## 2022-09-21 LAB
BILIRUBIN, POC: NORMAL
BLOOD URINE, POC: NORMAL
CLARITY, POC: CLEAR
COLOR, POC: YELLOW
GLUCOSE URINE, POC: NORMAL
KETONES, POC: NORMAL
LEUKOCYTE EST, POC: NORMAL
NITRITE, POC: POSITIVE
PH, POC: 5.5
PROTEIN, POC: NORMAL
SPECIFIC GRAVITY, POC: 1.02
UROBILINOGEN, POC: 0.2

## 2022-09-21 PROCEDURE — 99212 OFFICE O/P EST SF 10 MIN: CPT | Performed by: EMERGENCY MEDICINE

## 2022-09-21 PROCEDURE — 81002 URINALYSIS NONAUTO W/O SCOPE: CPT | Performed by: EMERGENCY MEDICINE

## 2022-09-21 PROCEDURE — 1123F ACP DISCUSS/DSCN MKR DOCD: CPT | Performed by: EMERGENCY MEDICINE

## 2022-09-21 RX ORDER — CEFDINIR 300 MG/1
300 CAPSULE ORAL 2 TIMES DAILY
Qty: 10 CAPSULE | Refills: 0 | Status: SHIPPED | OUTPATIENT
Start: 2022-09-21 | End: 2022-09-26

## 2022-09-21 ASSESSMENT — ENCOUNTER SYMPTOMS
COUGH: 0
EYE PAIN: 0
ABDOMINAL DISTENTION: 0
SORE THROAT: 0
BACK PAIN: 0
SINUS PRESSURE: 0
NAUSEA: 0
VOMITING: 0
WHEEZING: 0
EYE REDNESS: 0
EYE DISCHARGE: 0
SHORTNESS OF BREATH: 0
DIARRHEA: 0

## 2022-09-21 NOTE — PROGRESS NOTES
Chief Complaint:   Urinary Tract Infection      History of Present Illness   Source of history provided by:  patient. Pedro Christopher is a 80 y.o. old female who has a past medical history of:   Past Medical History:   Diagnosis Date    Atrial fibrillation ([address]     Compression fracture of L2 ([address]     H/O cardiovascular stress test 12/06/2021    Lexiscan    Hyperlipidemia     Hypertension     Thyroid disease     presents to the [facility] care for dysuria. Pt states the symptoms have progressed over the past 2  days. Pt states they have had increased frequency, lower abdominal pressure, and occasional nausea. NO flank pain. Denies any vaginal discharge, vaginal bleeding, vomiting, diarrhea, or lethargy. No LMP recorded. Patient has had a hysterectomy. ROS   Review of Systems   Constitutional:  Negative for chills and fever. HENT:  Negative for ear pain, sinus pressure and sore throat. Eyes:  Negative for pain, discharge and redness. Respiratory:  Negative for cough, shortness of breath and wheezing. Cardiovascular:  Negative for chest pain. Gastrointestinal:  Negative for abdominal distention, diarrhea, nausea and vomiting. Genitourinary:  Positive for frequency. Negative for dysuria. Musculoskeletal:  Negative for arthralgias and back pain. Skin:  Negative for rash and wound. Neurological:  Negative for weakness and headaches. Hematological:  Negative for adenopathy. Psychiatric/Behavioral: Negative. All other systems reviewed and are negative. Past Surgical history:   Past Surgical History:   Procedure Laterality Date    APPENDECTOMY      BACK SURGERY      BREAST SURGERY      CHOLECYSTECTOMY      HERNIA REPAIR      HYSTERECTOMY (CERVIX STATUS UNKNOWN)      TONSILLECTOMY AND ADENOIDECTOMY       Social History:  reports that she has never smoked. She has never used smokeless tobacco.  Family History: family history is not on file.    Allergies: Macrodantin Bowel sounds are normal.      Palpations: Abdomen is soft. Abdomen is not rigid. Tenderness: There is no abdominal tenderness. There is no guarding or rebound. Musculoskeletal:      Cervical back: Normal range of motion and neck supple. Skin:     General: Skin is warm and dry. Findings: No abrasion or rash. Neurological:      Mental Status: She is alert and oriented to person, place, and time. GCS: GCS eye subscore is 4. GCS verbal subscore is 5. GCS motor subscore is 6. Cranial Nerves: No cranial nerve deficit. Sensory: No sensory deficit. Coordination: Coordination normal.      Gait: Gait normal.        Lab / Imaging Results   (All laboratory and radiology results have been personally reviewed by myself)  Labs:  Results for orders placed or performed in visit on 09/21/22   POCT Urinalysis no Micro   Result Value Ref Range    Color, UA yellow     Clarity, UA clear     Glucose, UA POC neg     Bilirubin, UA neg     Ketones, UA trace     Spec Grav, UA 1.020     Blood, UA POC moderate     pH, UA 5.5     Protein, UA POC neg     Urobilinogen, UA 0.2     Leukocytes, UA large     Nitrite, UA positive        Imaging: All Radiology results interpreted by Radiologist unless otherwise noted. No orders to display       Medical Decision Making:    Patient is well appearing, non toxic and appropriate for outpatient management. Plan is for symptom management and PCP follow up. Assessment / Plan     Impression(s):  Noa Guan was seen today for urinary tract infection. Diagnoses and all orders for this visit:    Urinary tract infection without hematuria, site unspecified  -     POCT Urinalysis no Micro  -     cefdinir (OMNICEF) 300 MG capsule; Take 1 capsule by mouth 2 times daily for 5 days         Disposition:    Follow up with PCP in 7-10 days for repeat urine and recheck of symptoms. ER if changes or worse. Advised to take all medications as directed.

## 2022-10-12 RX ORDER — OXYBUTYNIN CHLORIDE 5 MG/1
1 TABLET, EXTENDED RELEASE ORAL DAILY
COMMUNITY
Start: 2022-07-18 | End: 2022-10-13 | Stop reason: SDUPTHER

## 2022-10-13 RX ORDER — OXYBUTYNIN CHLORIDE 5 MG/1
5 TABLET, EXTENDED RELEASE ORAL DAILY
Qty: 90 TABLET | Refills: 0 | Status: SHIPPED
Start: 2022-10-13 | End: 2022-10-26 | Stop reason: SDUPTHER

## 2022-10-20 ENCOUNTER — TELEPHONE (OUTPATIENT)
Dept: PRIMARY CARE CLINIC | Age: 87
End: 2022-10-20

## 2022-10-20 DIAGNOSIS — E78.2 MIXED HYPERLIPIDEMIA: ICD-10-CM

## 2022-10-20 DIAGNOSIS — E11.9 DIABETES MELLITUS WITHOUT COMPLICATION (HCC): ICD-10-CM

## 2022-10-20 DIAGNOSIS — I48.91 ATRIAL FIBRILLATION, UNSPECIFIED TYPE (HCC): Primary | ICD-10-CM

## 2022-10-20 DIAGNOSIS — E03.8 OTHER SPECIFIED HYPOTHYROIDISM: ICD-10-CM

## 2022-10-20 NOTE — TELEPHONE ENCOUNTER
----- Message from Del Olivarez sent at 10/19/2022  3:32 PM EDT -----  Subject: Referral Request    Reason for referral request? patient would like to get lab orders to have   done prior to her 10/26/22 appointment. PLease call pt to advise  Provider patient wants to be referred to(if known):     Provider Phone Number(if known):     Additional Information for Provider?   ---------------------------------------------------------------------------  --------------  [address] [employer]    [phone]; OK to leave message on voicemail  ---------------------------------------------------------------------------  --------------

## 2022-10-21 RX ORDER — FLUOCINONIDE TOPICAL SOLUTION USP, 0.05% 0.5 MG/ML
SOLUTION TOPICAL
COMMUNITY
Start: 2022-10-12 | End: 2022-10-26

## 2022-10-21 RX ORDER — GABAPENTIN 300 MG/1
CAPSULE ORAL
COMMUNITY
Start: 2022-07-18 | End: 2022-10-26 | Stop reason: SDUPTHER

## 2022-10-26 ENCOUNTER — OFFICE VISIT (OUTPATIENT)
Dept: PRIMARY CARE CLINIC | Age: 87
End: 2022-10-26
Payer: MEDICARE

## 2022-10-26 VITALS
TEMPERATURE: 97.8 F | OXYGEN SATURATION: 97 % | WEIGHT: 154 LBS | SYSTOLIC BLOOD PRESSURE: 122 MMHG | HEIGHT: 63 IN | BODY MASS INDEX: 27.29 KG/M2 | HEART RATE: 79 BPM | DIASTOLIC BLOOD PRESSURE: 76 MMHG

## 2022-10-26 DIAGNOSIS — I10 PRIMARY HYPERTENSION: ICD-10-CM

## 2022-10-26 DIAGNOSIS — F32.9 REACTIVE DEPRESSION: ICD-10-CM

## 2022-10-26 DIAGNOSIS — F51.01 PRIMARY INSOMNIA: ICD-10-CM

## 2022-10-26 DIAGNOSIS — E78.2 MIXED HYPERLIPIDEMIA: ICD-10-CM

## 2022-10-26 DIAGNOSIS — I25.10 ATHEROSCLEROSIS OF NATIVE CORONARY ARTERY OF NATIVE HEART WITHOUT ANGINA PECTORIS: ICD-10-CM

## 2022-10-26 DIAGNOSIS — R91.1 PULMONARY NODULE: ICD-10-CM

## 2022-10-26 DIAGNOSIS — E03.8 OTHER SPECIFIED HYPOTHYROIDISM: ICD-10-CM

## 2022-10-26 DIAGNOSIS — I48.0 PAROXYSMAL ATRIAL FIBRILLATION (HCC): ICD-10-CM

## 2022-10-26 DIAGNOSIS — I48.91 ATRIAL FIBRILLATION, UNSPECIFIED TYPE (HCC): ICD-10-CM

## 2022-10-26 DIAGNOSIS — E11.9 DIABETES MELLITUS WITHOUT COMPLICATION (HCC): ICD-10-CM

## 2022-10-26 DIAGNOSIS — Z00.00 INITIAL MEDICARE ANNUAL WELLNESS VISIT: Primary | ICD-10-CM

## 2022-10-26 DIAGNOSIS — R73.01 IMPAIRED FASTING GLUCOSE: ICD-10-CM

## 2022-10-26 DIAGNOSIS — E03.9 ACQUIRED HYPOTHYROIDISM: ICD-10-CM

## 2022-10-26 PROBLEM — R55 SYNCOPE: Status: RESOLVED | Noted: 2021-09-05 | Resolved: 2022-10-26

## 2022-10-26 PROBLEM — R07.89 OTHER CHEST PAIN: Status: RESOLVED | Noted: 2021-12-05 | Resolved: 2022-10-26

## 2022-10-26 PROBLEM — S06.5XAA SDH (SUBDURAL HEMATOMA) (HCC): Status: RESOLVED | Noted: 2021-09-05 | Resolved: 2022-10-26

## 2022-10-26 LAB
ALBUMIN SERPL-MCNC: 4.1 G/DL (ref 3.5–5.2)
ALP BLD-CCNC: 98 U/L (ref 35–104)
ALT SERPL-CCNC: 15 U/L (ref 0–32)
ANION GAP SERPL CALCULATED.3IONS-SCNC: 13 MMOL/L (ref 7–16)
AST SERPL-CCNC: 26 U/L (ref 0–31)
BILIRUB SERPL-MCNC: 0.6 MG/DL (ref 0–1.2)
BUN BLDV-MCNC: 26 MG/DL (ref 6–23)
CALCIUM SERPL-MCNC: 10.1 MG/DL (ref 8.6–10.2)
CHLORIDE BLD-SCNC: 104 MMOL/L (ref 98–107)
CHOLESTEROL, FASTING: 164 MG/DL (ref 0–199)
CO2: 23 MMOL/L (ref 22–29)
CREAT SERPL-MCNC: 1 MG/DL (ref 0.5–1)
GFR SERPL CREATININE-BSD FRML MDRD: 54 ML/MIN/1.73
GLUCOSE BLD-MCNC: 101 MG/DL (ref 74–99)
HBA1C MFR BLD: 6 % (ref 4–5.6)
HDLC SERPL-MCNC: 85 MG/DL
LDL CHOLESTEROL CALCULATED: 66 MG/DL (ref 0–99)
POTASSIUM SERPL-SCNC: 4.8 MMOL/L (ref 3.5–5)
SODIUM BLD-SCNC: 140 MMOL/L (ref 132–146)
T4 FREE: 1.16 NG/DL (ref 0.93–1.7)
TOTAL PROTEIN: 7.4 G/DL (ref 6.4–8.3)
TRIGLYCERIDE, FASTING: 67 MG/DL (ref 0–149)
TSH SERPL DL<=0.05 MIU/L-ACNC: 0.46 UIU/ML (ref 0.27–4.2)
VLDLC SERPL CALC-MCNC: 13 MG/DL

## 2022-10-26 PROCEDURE — 99214 OFFICE O/P EST MOD 30 MIN: CPT | Performed by: INTERNAL MEDICINE

## 2022-10-26 PROCEDURE — G0439 PPPS, SUBSEQ VISIT: HCPCS | Performed by: INTERNAL MEDICINE

## 2022-10-26 PROCEDURE — 1123F ACP DISCUSS/DSCN MKR DOCD: CPT | Performed by: INTERNAL MEDICINE

## 2022-10-26 RX ORDER — METOPROLOL SUCCINATE 25 MG/1
12.5 TABLET, EXTENDED RELEASE ORAL 3 TIMES DAILY PRN
Qty: 30 TABLET | Refills: 3 | Status: SHIPPED | OUTPATIENT
Start: 2022-10-26

## 2022-10-26 RX ORDER — LEVOTHYROXINE SODIUM 0.05 MG/1
50 TABLET ORAL DAILY
Qty: 90 TABLET | Refills: 0 | Status: SHIPPED | OUTPATIENT
Start: 2022-10-26

## 2022-10-26 RX ORDER — CITALOPRAM 20 MG/1
20 TABLET ORAL DAILY
Qty: 90 TABLET | Refills: 0 | Status: SHIPPED | OUTPATIENT
Start: 2022-10-26

## 2022-10-26 RX ORDER — ATORVASTATIN CALCIUM 20 MG/1
20 TABLET, FILM COATED ORAL NIGHTLY
Qty: 90 TABLET | Refills: 0 | Status: SHIPPED | OUTPATIENT
Start: 2022-10-26

## 2022-10-26 RX ORDER — GABAPENTIN 300 MG/1
300 CAPSULE ORAL DAILY
Qty: 90 CAPSULE | Refills: 0 | Status: SHIPPED | OUTPATIENT
Start: 2022-10-26 | End: 2023-01-24

## 2022-10-26 RX ORDER — OXYBUTYNIN CHLORIDE 5 MG/1
5 TABLET, EXTENDED RELEASE ORAL DAILY
Qty: 90 TABLET | Refills: 0 | Status: SHIPPED | OUTPATIENT
Start: 2022-10-26 | End: 2023-01-24

## 2022-10-26 SDOH — ECONOMIC STABILITY: FOOD INSECURITY: WITHIN THE PAST 12 MONTHS, YOU WORRIED THAT YOUR FOOD WOULD RUN OUT BEFORE YOU GOT MONEY TO BUY MORE.: NEVER TRUE

## 2022-10-26 SDOH — ECONOMIC STABILITY: FOOD INSECURITY: WITHIN THE PAST 12 MONTHS, THE FOOD YOU BOUGHT JUST DIDN'T LAST AND YOU DIDN'T HAVE MONEY TO GET MORE.: NEVER TRUE

## 2022-10-26 ASSESSMENT — PATIENT HEALTH QUESTIONNAIRE - PHQ9
1. LITTLE INTEREST OR PLEASURE IN DOING THINGS: 0
SUM OF ALL RESPONSES TO PHQ QUESTIONS 1-9: 0
2. FEELING DOWN, DEPRESSED OR HOPELESS: 0
SUM OF ALL RESPONSES TO PHQ QUESTIONS 1-9: 0
SUM OF ALL RESPONSES TO PHQ QUESTIONS 1-9: 0
SUM OF ALL RESPONSES TO PHQ9 QUESTIONS 1 & 2: 0
SUM OF ALL RESPONSES TO PHQ QUESTIONS 1-9: 0

## 2022-10-26 ASSESSMENT — ENCOUNTER SYMPTOMS
SORE THROAT: 0
CONSTIPATION: 0
BACK PAIN: 0
ABDOMINAL DISTENTION: 0
DIARRHEA: 0
WHEEZING: 0
VOMITING: 0
PHOTOPHOBIA: 0
SINUS PAIN: 0
EYE ITCHING: 0
APNEA: 0
BLOOD IN STOOL: 0
ABDOMINAL PAIN: 0
SHORTNESS OF BREATH: 0
NAUSEA: 0
EYE DISCHARGE: 0
COUGH: 0
TROUBLE SWALLOWING: 0

## 2022-10-26 ASSESSMENT — LIFESTYLE VARIABLES
HOW OFTEN DO YOU HAVE A DRINK CONTAINING ALCOHOL: MONTHLY OR LESS
HOW MANY STANDARD DRINKS CONTAINING ALCOHOL DO YOU HAVE ON A TYPICAL DAY: 1 OR 2

## 2022-10-26 ASSESSMENT — SOCIAL DETERMINANTS OF HEALTH (SDOH): HOW HARD IS IT FOR YOU TO PAY FOR THE VERY BASICS LIKE FOOD, HOUSING, MEDICAL CARE, AND HEATING?: NOT HARD AT ALL

## 2022-10-26 NOTE — PROGRESS NOTES
Medicare Annual Wellness Visit    Chantal Moreno is here for Medicare AWV    Assessment & Plan   Initial Medicare annual wellness visit    Recommendations for Preventive Services Due: see orders and patient instructions/AVS.  Recommended screening schedule for the next 5-10 years is provided to the patient in written form: see Patient Instructions/AVS.     Return for Medicare Annual Wellness Visit in 1 year. Subjective       Patient's complete Health Risk Assessment and screening values have been reviewed and are found in Flowsheets. The following problems were reviewed today and where indicated follow up appointments were made and/or referrals ordered.     Positive Risk Factor Screenings with Interventions:             General Health and ACP:  General  In general, how would you say your health is?: Very Good  In the past 7 days, have you experienced any of the following: New or Increased Pain, New or Increased Fatigue, Loneliness, Social Isolation, Stress or Anger?: No  Do you get the social and emotional support that you need?: Yes  Do you have a Living Will?: Yes    Advance Directives       Power of  Living Will ACP-Advance Directive ACP-Power of     Not on File Not on File Not on File Not on File          General Health Risk Interventions:  No Living Will: Advance Care Planning addressed with patient today    Health Habits/Nutrition:  Physical Activity: Inactive    Days of Exercise per Week: 0 days    Minutes of Exercise per Session: 0 min     Have you lost any weight without trying in the past 3 months?: No  Body mass index: (!) 27.28  Have you seen the dentist within the past year?: Yes  Health Habits/Nutrition Interventions:  Inadequate physical activity:  patient agrees to exercise for at least 150 minutes/week    Hearing/Vision:  Do you or your family notice any trouble with your hearing that hasn't been managed with hearing aids?: No  Do you have difficulty driving, watching TV, or doing any of your daily activities because of your eyesight?: No  Have you had an eye exam within the past year?: (!) No  No results found. Hearing/Vision Interventions:  Pt has a hearing aid and wears glasses            Objective   Vitals:    10/26/22 1127   BP: 122/76   Pulse: 79   Temp: 97.8 °F (36.6 °C)   TempSrc: Temporal   SpO2: 97%   Weight: 154 lb (69.9 kg)   Height: 5' 3\" (1.6 m)      Body mass index is 27.28 kg/m². Allergies   Allergen Reactions    Caffeine Other (See Comments)     Aggrevates arrhythmia    Erythromycin Other (See Comments)     Other reaction(s): redness, itching  Nausea      Macrodantin [Nitrofurantoin] Other (See Comments)     Causes high fever     Codeine Nausea And Vomiting     Prior to Visit Medications    Medication Sig Taking?  Authorizing Provider   gabapentin (NEURONTIN) 300 MG capsule  Yes Historical Provider, MD   oxybutynin (DITROPAN-XL) 5 MG extended release tablet Take 1 tablet by mouth daily Yes Natalie Barcenas MD   apixaban (ELIQUIS) 5 MG TABS tablet Take 1 tablet by mouth 2 times daily Yes Fabiola Mckeon MD   metoprolol succinate (TOPROL XL) 25 MG extended release tablet Take 0.5 tablets by mouth 3 times daily as needed (palpitation) Yes Misha Barcenas MD   atorvastatin (LIPITOR) 20 MG tablet Take 1 tablet by mouth nightly Yes Misha Barcenas MD   Vitamin D, Cholecalciferol, 1000 units TABS Take by mouth Yes Historical Provider, MD   citalopram (CELEXA) 20 MG tablet Take 20 mg by mouth Yes Historical Provider, MD   cyanocobalamin 500 MCG tablet Take by mouth Yes Historical Provider, MD   levothyroxine (SYNTHROID) 50 MCG tablet Take 50 mcg by mouth Yes Historical Provider, MD   fluocinonide (LIDEX) 0.05 % external solution   Historical Provider, MD   levETIRAcetam (KEPPRA) 500 MG tablet Take 1 tablet by mouth 2 times daily for 5 days  Tash Madrid MD       [facility] (Including outside providers/suppliers regularly involved in providing care): Patient Care Team:  Raheel Marx MD as PCP - Ivy Snyder MD as PCP - [facility] Empaneled Provider     Reviewed and updated this visit:  Tobacco  Allergies  Meds  Med Hx  Surg Hx  Soc Hx  Fam Hx

## 2022-10-26 NOTE — PROGRESS NOTES
Maryanne Madrid (:  1934) is a 80 y.o. female,Established patient, here for evaluation of the following chief complaint(s):  Medicare AWV      Subjective   SUBJECTIVE/OBJECTIVE:  HPI:  1)Hypertension:  Patient is here for follow up chronic hypertension. This is  generally controlled on current medication regimen. BP Readings from Last 1 Encounters:   10/26/22 122/76        Takes meds as directed and tolerates them well. Most recent labs reviewed with patient and are not remarkable. No symptoms from htn standpoint per ROS. Patient is  compliant with lifestyle modifications. Patient does not smoke. Comorbid conditions include obesity   2)Hyperlipidemia:  Patient is here to follow up regarding chronic hyperlipidemia. This is  generally controlled. Treatment includes atorvastatin. Patient is  compliant with lifestyle modifications. Patient is not a smoker. Most recent labs reviewed with patient today and are not remarkable. Comorbid conditions include obesity. 3) afib: well controlled on eliquis and metoprolol  4)depression: doing well   5) primary insomnia : controlled on gabapentin  Review of Systems   Constitutional:  Negative for activity change, appetite change, fever and unexpected weight change. HENT:  Negative for congestion, ear pain, hearing loss, sinus pain, sore throat, tinnitus and trouble swallowing. Eyes:  Negative for photophobia, discharge, itching and visual disturbance. Respiratory:  Negative for apnea, cough, shortness of breath and wheezing. Cardiovascular:  Negative for chest pain, palpitations and leg swelling. Gastrointestinal:  Negative for abdominal distention, abdominal pain, blood in stool, constipation, diarrhea, nausea and vomiting. Endocrine: Negative for cold intolerance, polydipsia and polyuria. Genitourinary:  Negative for difficulty urinating, dysuria, frequency and pelvic pain.    Musculoskeletal:  Negative for arthralgias, back pain, joint swelling, myalgias, neck pain and neck stiffness. Skin:  Negative for rash and wound. Neurological:  Negative for dizziness, tremors, syncope, light-headedness and headaches. Objective   /76   Pulse 79   Temp 97.8 °F (36.6 °C) (Temporal)   Ht 5' 3\" (1.6 m)   Wt 154 lb (69.9 kg)   SpO2 97%   BMI 27.28 kg/m²   Current Outpatient Medications   Medication Sig Dispense Refill    oxybutynin (DITROPAN-XL) 5 MG extended release tablet Take 1 tablet by mouth daily 90 tablet 0    metoprolol succinate (TOPROL XL) 25 MG extended release tablet Take 0.5 tablets by mouth 3 times daily as needed (palpitation) 30 tablet 3    atorvastatin (LIPITOR) 20 MG tablet Take 1 tablet by mouth nightly 90 tablet 0    apixaban (ELIQUIS) 5 MG TABS tablet Take 1 tablet by mouth 2 times daily 180 tablet 0    levothyroxine (SYNTHROID) 50 MCG tablet Take 1 tablet by mouth Daily 90 tablet 0    gabapentin (NEURONTIN) 300 MG capsule Take 1 capsule by mouth daily for 90 days. 90 capsule 0    citalopram (CELEXA) 20 MG tablet Take 1 tablet by mouth daily 90 tablet 0    Vitamin D, Cholecalciferol, 1000 units TABS Take by mouth      cyanocobalamin 500 MCG tablet Take by mouth       No current facility-administered medications for this visit.       Allergies   Allergen Reactions    Caffeine Other (See Comments)     Aggrevates arrhythmia    Erythromycin Other (See Comments)     Other reaction(s): redness, itching  Nausea      Macrodantin [Nitrofurantoin] Other (See Comments)     Causes high fever     Codeine Nausea And Vomiting        Past Medical History:   Diagnosis Date    Atrial fibrillation (HCC)     Compression fracture of L2 ([address]     Depression     H/O cardiovascular stress test 12/06/2021    Lexiscan    Hyperlipidemia     Hypertension     Hypothyroidism     Thyroid disease      Past Surgical History:   Procedure Laterality Date    APPENDECTOMY      BACK SURGERY      BREAST SURGERY      CHOLECYSTECTOMY      HERNIA REPAIR      HYSTERECTOMY (CERVIX STATUS UNKNOWN)      HYSTERECTOMY, TOTAL ABDOMINAL (CERVIX REMOVED)      TONSILLECTOMY AND ADENOIDECTOMY       Family History   Problem Relation Age of Onset    Melanoma Mother     Colon Cancer Father       Social History     Socioeconomic History    Marital status:      Spouse name: Not on file    Number of children: Not on file    Years of education: Not on file    Highest education level: Not on file   Occupational History    Not on file   Tobacco Use    Smoking status: Never    Smokeless tobacco: Never   Substance and Sexual Activity    Alcohol use: Not on file    Drug use: Not on file    Sexual activity: Not on file   Other Topics Concern    Not on file   Social History Narrative    Not on file     Social Determinants of Health     Financial Resource Strain: Low Risk     Difficulty of Paying Living Expenses: Not hard at all   Food Insecurity: No Food Insecurity    Worried About Running Out of Food in the Last Year: Never true    [address] in the Last Year: Never true   Transportation Needs: Not on file   Physical Activity: Inactive    Days of Exercise per Week: 0 days    Minutes of Exercise per Session: 0 min   Stress: Not on file   Social Connections: Not on file   Intimate Partner Violence: Not on file   Housing Stability: Not on file      Health Maintenance Due   Topic Date Due    Shingles vaccine (1 of 2) Never done    Pneumococcal 65+ years Vaccine (2 - PPSV23 if available, else PCV20) 07/06/2016    COVID-19 Vaccine (5 - Booster for Pfizer series) 05/27/2022    Flu vaccine (1) 08/01/2022        Physical Exam  Constitutional:       Appearance: Normal appearance. She is normal weight. HENT:      Head: Normocephalic and atraumatic. Right Ear: Tympanic membrane and ear canal normal.      Left Ear: Tympanic membrane and ear canal normal.      Nose: Nose normal.      Mouth/Throat:      Mouth: Mucous membranes are moist.      Pharynx: Oropharynx is clear. Eyes:      Extraocular Movements: Extraocular movements intact. Conjunctiva/sclera: Conjunctivae normal.      Pupils: Pupils are equal, round, and reactive to light. Cardiovascular:      Rate and Rhythm: Normal rate and regular rhythm. Pulses: Normal pulses. Heart sounds: Normal heart sounds. Pulmonary:      Effort: Pulmonary effort is normal.      Breath sounds: Normal breath sounds. Abdominal:      General: Abdomen is flat. Bowel sounds are normal.      Palpations: Abdomen is soft. Musculoskeletal:         General: Normal range of motion. Right shoulder: Normal.      Left shoulder: Normal.      Right upper arm: Normal.      Left upper arm: Normal.      Right elbow: Normal.      Left elbow: Normal.      Right forearm: Normal.      Left forearm: Normal.      Right wrist: Normal.      Left wrist: Normal.      Right hand: Normal.      Left hand: Normal.      Cervical back: Normal, normal range of motion and neck supple. Lumbar back: Normal.   Skin:     General: Skin is warm and dry. Neurological:      General: No focal deficit present. Mental Status: She is alert and oriented to person, place, and time. Mental status is at baseline. Psychiatric:         Mood and Affect: Mood normal.         Behavior: Behavior normal.         Thought Content: Thought content normal.         Judgment: Judgment normal.             ASSESSMENT/PLAN:  1. Initial Medicare annual wellness visit  2. Mixed hyperlipidemia  -     Comprehensive Metabolic Panel; Future  -     Lipid, Fasting; Future  3. Primary hypertension  4. Acquired hypothyroidism  -     T4, Free; Future  -     TSH; Future  5. Primary insomnia  -     gabapentin (NEURONTIN) 300 MG capsule; Take 1 capsule by mouth daily for 90 days. , Disp-90 capsule, R-0Normal  6. Paroxysmal atrial fibrillation (HCC)  7. Atherosclerosis of native coronary artery of native heart without angina pectoris  8.  Pulmonary nodule  -     CT CHEST W WO CONTRAST; Future  9. Reactive depression  10. Impaired fasting glucose  -     Hemoglobin A1C; Future      Return for Medicare Annual Wellness Visit in 1 year. An electronic signature was used to authenticate this note.     --Hema Calderón MD

## 2022-10-26 NOTE — PATIENT INSTRUCTIONS
Personalized Preventive Plan for Nancy Brochure - 10/26/2022  Medicare offers a range of preventive health benefits. Some of the tests and screenings are paid in full while other may be subject to a deductible, co-insurance, and/or copay. Some of these benefits include a comprehensive review of your medical history including lifestyle, illnesses that may run in your family, and various assessments and screenings as appropriate. After reviewing your medical record and screening and assessments performed today your provider may have ordered immunizations, labs, imaging, and/or referrals for you. A list of these orders (if applicable) as well as your Preventive Care list are included within your After Visit Summary for your review. Other Preventive Recommendations:    A preventive eye exam performed by an eye specialist is recommended every 1-2 years to screen for glaucoma; cataracts, macular degeneration, and other eye disorders. A preventive dental visit is recommended every 6 months. Try to get at least 150 minutes of exercise per week or 10,000 steps per day on a pedometer . Order or download the FREE \"Exercise & Physical Activity: Your Everyday Guide\" from [employer]. Call 0-783.464.8667 or search [employer] online. You need 8118-3228 mg of calcium and 2822-3313 IU of vitamin D per day. It is possible to meet your calcium requirement with diet alone, but a vitamin D supplement is usually necessary to meet this goal.  When exposed to the sun, use a sunscreen that protects against both UVA and UVB radiation with an SPF of 30 or greater. Reapply every 2 to 3 hours or after sweating, drying off with a towel, or swimming. Always wear a seat belt when traveling in a car. Always wear a helmet when riding a bicycle or motorcycle.

## 2022-10-27 DIAGNOSIS — E11.9 DIABETES MELLITUS WITHOUT COMPLICATION (HCC): ICD-10-CM

## 2022-10-27 LAB
CREATININE URINE: 36 MG/DL (ref 29–226)
MICROALBUMIN UR-MCNC: <12 MG/L
MICROALBUMIN/CREAT UR-RTO: ABNORMAL (ref 0–30)

## 2022-12-14 DIAGNOSIS — F51.01 PRIMARY INSOMNIA: ICD-10-CM

## 2022-12-14 RX ORDER — GABAPENTIN 300 MG/1
300 CAPSULE ORAL DAILY
Qty: 90 CAPSULE | Refills: 0 | Status: SHIPPED | OUTPATIENT
Start: 2022-12-14 | End: 2023-03-14

## 2022-12-14 RX ORDER — METOPROLOL SUCCINATE 25 MG/1
12.5 TABLET, EXTENDED RELEASE ORAL 3 TIMES DAILY PRN
Qty: 30 TABLET | Refills: 3 | Status: SHIPPED | OUTPATIENT
Start: 2022-12-14

## 2022-12-14 RX ORDER — LEVOTHYROXINE SODIUM 0.05 MG/1
50 TABLET ORAL DAILY
Qty: 90 TABLET | Refills: 0 | Status: SHIPPED | OUTPATIENT
Start: 2022-12-14

## 2022-12-14 RX ORDER — ATORVASTATIN CALCIUM 20 MG/1
20 TABLET, FILM COATED ORAL NIGHTLY
Qty: 90 TABLET | Refills: 0 | Status: SHIPPED | OUTPATIENT
Start: 2022-12-14

## 2022-12-14 RX ORDER — CITALOPRAM 20 MG/1
20 TABLET ORAL DAILY
Qty: 90 TABLET | Refills: 0 | Status: SHIPPED | OUTPATIENT
Start: 2022-12-14

## 2022-12-14 RX ORDER — OXYBUTYNIN CHLORIDE 5 MG/1
5 TABLET, EXTENDED RELEASE ORAL DAILY
Qty: 90 TABLET | Refills: 0 | Status: SHIPPED | OUTPATIENT
Start: 2022-12-14 | End: 2023-03-14

## 2023-01-16 ASSESSMENT — ENCOUNTER SYMPTOMS
TROUBLE SWALLOWING: 0
BACK PAIN: 0
CONSTIPATION: 0
NAUSEA: 0
WHEEZING: 0
EYE ITCHING: 0
APNEA: 0
SINUS PAIN: 0
EYE DISCHARGE: 0
SORE THROAT: 0
VOMITING: 0
ABDOMINAL PAIN: 0
ABDOMINAL DISTENTION: 0
BLOOD IN STOOL: 0
COUGH: 0
SHORTNESS OF BREATH: 0
DIARRHEA: 0
PHOTOPHOBIA: 0

## 2023-01-16 NOTE — PROGRESS NOTES
Sy Peres (:  1934) is a 80 y.o. female,Established patient, here for evaluation of the following chief complaint(s):  3 Month Follow-Up      Subjective   SUBJECTIVE/OBJECTIVE:  HPI:  1)Hypothyroidism:  Patient is here today to follow up chronic hypothyroidism. This is   generally controlled on current medication regimen. Takes medication as directed and tolerates well. Symptoms from thyroid standpoint include none. Most recent labs reviewed with patient and are not remarkable. Thyroid function in particular is  controlled. 2)Hyperlipidemia:  Patient is here to follow up regarding chronic hyperlipidemia. This is  generally controlled. Treatment includes atorvastatin. Patient is  compliant with lifestyle modifications. Patient is not a smoker. Most recent labs reviewed with patient today and are not remarkable. Comorbid conditions include obesity. 3)Insomnia:  Patient complains of insomnia. Problem duration:years. Patient describes symptoms as difficulty falling asleep  4)Atrial fibrillation  5) impaired fasting glucose  6) lumbar radiculopathy: we will add 100 mg qam  Review of Systems   Constitutional:  Negative for activity change, appetite change, fever and unexpected weight change. HENT:  Negative for congestion, ear pain, hearing loss, sinus pain, sore throat, tinnitus and trouble swallowing. Eyes:  Negative for photophobia, discharge, itching and visual disturbance. Respiratory:  Negative for apnea, cough, shortness of breath and wheezing. Cardiovascular:  Negative for chest pain, palpitations and leg swelling. Gastrointestinal:  Negative for abdominal distention, abdominal pain, blood in stool, constipation, diarrhea, nausea and vomiting. Endocrine: Negative for cold intolerance, polydipsia and polyuria. Genitourinary:  Negative for difficulty urinating, dysuria, frequency and pelvic pain.    Musculoskeletal:  Negative for arthralgias, back pain, joint swelling, myalgias, neck pain and neck stiffness. Skin:  Negative for rash and wound. Neurological:  Negative for dizziness, tremors, syncope, light-headedness and headaches. CMP:    Lab Results   Component Value Date/Time     01/26/2023 11:51 AM    K 4.6 01/26/2023 11:51 AM    K 4.2 12/05/2021 06:25 AM     01/26/2023 11:51 AM    CO2 26 01/26/2023 11:51 AM    BUN 28 01/27/2023 09:39 AM    CREATININE 1.0 01/27/2023 09:39 AM    GFRAA >60 12/05/2021 06:25 AM    LABGLOM 54 01/27/2023 09:39 AM    GLUCOSE 103 01/26/2023 11:51 AM    PROT 6.9 01/26/2023 11:51 AM    LABALBU 3.8 01/26/2023 11:51 AM    CALCIUM 9.8 01/26/2023 11:51 AM    BILITOT 0.6 01/26/2023 11:51 AM    ALKPHOS 88 01/26/2023 11:51 AM    AST 25 01/26/2023 11:51 AM    ALT 15 01/26/2023 11:51 AM     HgBA1c:    Lab Results   Component Value Date/Time    LABA1C 5.9 01/26/2023 11:51 AM     Lipid: No results found for: CHOL  No results found for: TRIG  Lab Results   Component Value Date    HDL 84 01/26/2023    HDL 85 10/26/2022     Lab Results   Component Value Date    LDLCALC 58 01/26/2023    LDLCALC 66 10/26/2022     Lab Results   Component Value Date    LABVLDL 17 01/26/2023    LABVLDL 13 10/26/2022     No results found for: CHOLHDLRATIO  TSH:    Lab Results   Component Value Date/Time    TSH 0.642 01/26/2023 11:51 AM     Free T4:   Lab Results   Component Value Date/Time    T4FREE 1.27 01/26/2023 11:51 AM     Urine/Microalbumin Cr Ratio:   Microalbumin Creatinine Ratio   Date Value Ref Range Status   10/27/2022 - (AA) 0.0 - 30.0 Final     Comment:     - URINE ALBUMIN CONCENTRATION IS LESS THAN 12.0 mg/dL. THEREFORE, UNABLE TO CALCULATE ALBUMIN/CREATININE RATIO.               Objective   /70   Pulse 69   Temp 98.6 °F (37 °C) (Temporal)   Ht 5' 3\" (1.6 m)   Wt 168 lb (76.2 kg)   SpO2 98%   BMI 29.76 kg/m²   Current Outpatient Medications   Medication Sig Dispense Refill    apixaban (ELIQUIS) 5 MG TABS tablet Take 1 tablet by mouth 2 times daily 180 tablet 0    atorvastatin (LIPITOR) 20 MG tablet Take 1 tablet by mouth nightly 90 tablet 0    citalopram (CELEXA) 20 MG tablet Take 1 tablet by mouth daily 90 tablet 0    gabapentin (NEURONTIN) 300 MG capsule Take 1 capsule by mouth daily for 90 days. 90 capsule 0    levothyroxine (SYNTHROID) 50 MCG tablet Take 1 tablet by mouth Daily 90 tablet 0    metoprolol succinate (TOPROL XL) 25 MG extended release tablet Take 0.5 tablets by mouth 3 times daily as needed (palpitation) 30 tablet 3    oxybutynin (DITROPAN-XL) 5 MG extended release tablet Take 1 tablet by mouth daily 90 tablet 0    Vitamin D, Cholecalciferol, 1000 units TABS Take by mouth      cyanocobalamin 500 MCG tablet Take by mouth       No current facility-administered medications for this visit. Allergies   Allergen Reactions    Caffeine Other (See Comments)     Aggrevates arrhythmia    Erythromycin Other (See Comments)     Other reaction(s): redness, itching  Nausea      Macrodantin [Nitrofurantoin] Other (See Comments)     Causes high fever     Codeine Nausea And Vomiting        Past Medical History:   Diagnosis Date    Atrial fibrillation (HCC)     Compression fracture of L2 (HCC)     Depression     H/O cardiovascular stress test 12/06/2021    Lexiscan    Hyperlipidemia     Hypertension     Hypothyroidism     Thyroid disease      Past Surgical History:   Procedure Laterality Date    APPENDECTOMY      BACK SURGERY      BREAST SURGERY      CHOLECYSTECTOMY      HERNIA REPAIR      HYSTERECTOMY (CERVIX STATUS UNKNOWN)      HYSTERECTOMY, TOTAL ABDOMINAL (CERVIX REMOVED)      TONSILLECTOMY AND ADENOIDECTOMY       Family History   Problem Relation Age of Onset    Melanoma Mother     Colon Cancer Father       Social History     Socioeconomic History    Marital status:       Spouse name: Not on file    Number of children: Not on file    Years of education: Not on file    Highest education level: Not on file   Occupational History    Not on file   Tobacco Use    Smoking status: Never    Smokeless tobacco: Never   Substance and Sexual Activity    Alcohol use: Not on file    Drug use: Not on file    Sexual activity: Not on file   Other Topics Concern    Not on file   Social History Narrative    Not on file     Social Determinants of Health     Financial Resource Strain: Low Risk     Difficulty of Paying Living Expenses: Not hard at all   Food Insecurity: No Food Insecurity    Worried About Running Out of Food in the Last Year: Never true    Ran Out of Food in the Last Year: Never true   Transportation Needs: Not on file   Physical Activity: Inactive    Days of Exercise per Week: 0 days    Minutes of Exercise per Session: 0 min   Stress: Not on file   Social Connections: Not on file   Intimate Partner Violence: Not on file   Housing Stability: Not on file      There are no preventive care reminders to display for this patient. Physical Exam  Constitutional:       Appearance: Normal appearance. She is normal weight. HENT:      Head: Normocephalic and atraumatic. Right Ear: Tympanic membrane and ear canal normal.      Left Ear: Tympanic membrane and ear canal normal.      Nose: Nose normal.      Mouth/Throat:      Mouth: Mucous membranes are moist.      Pharynx: Oropharynx is clear. Eyes:      Extraocular Movements: Extraocular movements intact. Conjunctiva/sclera: Conjunctivae normal.      Pupils: Pupils are equal, round, and reactive to light. Cardiovascular:      Rate and Rhythm: Normal rate and regular rhythm. Pulses: Normal pulses. Heart sounds: Normal heart sounds. Pulmonary:      Effort: Pulmonary effort is normal.      Breath sounds: Normal breath sounds. Abdominal:      General: Abdomen is flat. Bowel sounds are normal.      Palpations: Abdomen is soft. Musculoskeletal:         General: Normal range of motion.       Right shoulder: Normal.      Left shoulder: Normal.      Right upper arm: Normal.      Left upper arm: Normal.      Right elbow: Normal.      Left elbow: Normal.      Right forearm: Normal.      Left forearm: Normal.      Right wrist: Normal.      Left wrist: Normal.      Right hand: Normal.      Left hand: Normal.      Cervical back: Normal, normal range of motion and neck supple. Lumbar back: Normal.   Skin:     General: Skin is warm and dry. Neurological:      General: No focal deficit present. Mental Status: She is alert and oriented to person, place, and time. Mental status is at baseline. Psychiatric:         Mood and Affect: Mood normal.         Behavior: Behavior normal.         Thought Content: Thought content normal.         Judgment: Judgment normal.             ASSESSMENT/PLAN:  1. Acquired hypothyroidism  2. Primary insomnia  The following orders have not been finalized:  -     gabapentin (NEURONTIN) 300 MG capsule  3. Mixed hyperlipidemia  4. Paroxysmal atrial fibrillation (HCC)  5. Atherosclerosis of native coronary artery of native heart without angina pectoris  6. Lumbar radiculopathy    No follow-ups on file. An electronic signature was used to authenticate this note.     --Sandy Dill MD

## 2023-01-26 DIAGNOSIS — E78.2 MIXED HYPERLIPIDEMIA: ICD-10-CM

## 2023-01-26 DIAGNOSIS — R73.01 IMPAIRED FASTING GLUCOSE: ICD-10-CM

## 2023-01-26 DIAGNOSIS — E03.9 ACQUIRED HYPOTHYROIDISM: ICD-10-CM

## 2023-01-26 LAB
ALBUMIN SERPL-MCNC: 3.8 G/DL (ref 3.5–5.2)
ALP BLD-CCNC: 88 U/L (ref 35–104)
ALT SERPL-CCNC: 15 U/L (ref 0–32)
ANION GAP SERPL CALCULATED.3IONS-SCNC: 11 MMOL/L (ref 7–16)
AST SERPL-CCNC: 25 U/L (ref 0–31)
BILIRUB SERPL-MCNC: 0.6 MG/DL (ref 0–1.2)
BUN BLDV-MCNC: 25 MG/DL (ref 6–23)
CALCIUM SERPL-MCNC: 9.8 MG/DL (ref 8.6–10.2)
CHLORIDE BLD-SCNC: 103 MMOL/L (ref 98–107)
CHOLESTEROL, FASTING: 159 MG/DL (ref 0–199)
CO2: 26 MMOL/L (ref 22–29)
CREAT SERPL-MCNC: 1 MG/DL (ref 0.5–1)
GFR SERPL CREATININE-BSD FRML MDRD: 54 ML/MIN/1.73
GLUCOSE BLD-MCNC: 103 MG/DL (ref 74–99)
HBA1C MFR BLD: 5.9 % (ref 4–5.6)
HDLC SERPL-MCNC: 84 MG/DL
LDL CHOLESTEROL CALCULATED: 58 MG/DL (ref 0–99)
POTASSIUM SERPL-SCNC: 4.6 MMOL/L (ref 3.5–5)
SODIUM BLD-SCNC: 140 MMOL/L (ref 132–146)
T4 FREE: 1.27 NG/DL (ref 0.93–1.7)
TOTAL PROTEIN: 6.9 G/DL (ref 6.4–8.3)
TRIGLYCERIDE, FASTING: 87 MG/DL (ref 0–149)
TSH SERPL DL<=0.05 MIU/L-ACNC: 0.64 UIU/ML (ref 0.27–4.2)
VLDLC SERPL CALC-MCNC: 17 MG/DL

## 2023-01-27 ENCOUNTER — HOSPITAL ENCOUNTER (OUTPATIENT)
Age: 88
Discharge: HOME | End: 2023-01-27
Payer: MEDICARE

## 2023-01-27 ENCOUNTER — HOSPITAL ENCOUNTER (OUTPATIENT)
Dept: CT IMAGING | Age: 88
Discharge: HOME | End: 2023-01-27
Payer: MEDICARE

## 2023-01-27 DIAGNOSIS — R91.1 PULMONARY NODULE: ICD-10-CM

## 2023-01-27 LAB
BUN BLDV-MCNC: 28 MG/DL (ref 6–23)
CREAT SERPL-MCNC: 1 MG/DL (ref 0.5–1)
GFR SERPL CREATININE-BSD FRML MDRD: 54 ML/MIN/1.73

## 2023-01-27 PROCEDURE — 6360000004 HC RX CONTRAST MEDICATION: Performed by: RADIOLOGY

## 2023-01-27 PROCEDURE — 36415 COLL VENOUS BLD VENIPUNCTURE: CPT

## 2023-01-27 PROCEDURE — 84520 ASSAY OF UREA NITROGEN: CPT

## 2023-01-27 PROCEDURE — 82565 ASSAY OF CREATININE: CPT

## 2023-01-27 PROCEDURE — 71270 CT THORAX DX C-/C+: CPT

## 2023-01-27 RX ADMIN — IOPAMIDOL 75 ML: 755 INJECTION, SOLUTION INTRAVENOUS at 10:15

## 2023-01-31 ENCOUNTER — OFFICE VISIT (OUTPATIENT)
Dept: PRIMARY CARE CLINIC | Age: 88
End: 2023-01-31
Payer: MEDICARE

## 2023-01-31 VITALS
SYSTOLIC BLOOD PRESSURE: 120 MMHG | DIASTOLIC BLOOD PRESSURE: 70 MMHG | WEIGHT: 168 LBS | TEMPERATURE: 98.6 F | HEART RATE: 69 BPM | HEIGHT: 63 IN | BODY MASS INDEX: 29.77 KG/M2 | OXYGEN SATURATION: 98 %

## 2023-01-31 DIAGNOSIS — F51.01 PRIMARY INSOMNIA: ICD-10-CM

## 2023-01-31 DIAGNOSIS — I48.0 PAROXYSMAL ATRIAL FIBRILLATION (HCC): ICD-10-CM

## 2023-01-31 DIAGNOSIS — R73.01 IMPAIRED FASTING GLUCOSE: ICD-10-CM

## 2023-01-31 DIAGNOSIS — E78.2 MIXED HYPERLIPIDEMIA: ICD-10-CM

## 2023-01-31 DIAGNOSIS — I25.10 ATHEROSCLEROSIS OF NATIVE CORONARY ARTERY OF NATIVE HEART WITHOUT ANGINA PECTORIS: ICD-10-CM

## 2023-01-31 DIAGNOSIS — M54.16 LUMBAR RADICULOPATHY: ICD-10-CM

## 2023-01-31 DIAGNOSIS — E03.9 ACQUIRED HYPOTHYROIDISM: Primary | ICD-10-CM

## 2023-01-31 PROCEDURE — 1123F ACP DISCUSS/DSCN MKR DOCD: CPT | Performed by: INTERNAL MEDICINE

## 2023-01-31 PROCEDURE — 99214 OFFICE O/P EST MOD 30 MIN: CPT | Performed by: INTERNAL MEDICINE

## 2023-01-31 RX ORDER — GABAPENTIN 100 MG/1
100 CAPSULE ORAL 2 TIMES DAILY
Qty: 180 CAPSULE | Refills: 0 | Status: SHIPPED | OUTPATIENT
Start: 2023-01-31 | End: 2023-05-01

## 2023-01-31 RX ORDER — CITALOPRAM 20 MG/1
20 TABLET ORAL DAILY
Qty: 90 TABLET | Refills: 0 | Status: SHIPPED | OUTPATIENT
Start: 2023-01-31

## 2023-01-31 RX ORDER — METOPROLOL SUCCINATE 25 MG/1
12.5 TABLET, EXTENDED RELEASE ORAL 3 TIMES DAILY PRN
Qty: 30 TABLET | Refills: 3 | Status: SHIPPED | OUTPATIENT
Start: 2023-01-31

## 2023-01-31 RX ORDER — LEVOTHYROXINE SODIUM 0.05 MG/1
50 TABLET ORAL DAILY
Qty: 90 TABLET | Refills: 0 | Status: SHIPPED | OUTPATIENT
Start: 2023-01-31

## 2023-01-31 RX ORDER — GABAPENTIN 300 MG/1
300 CAPSULE ORAL DAILY
Qty: 90 CAPSULE | Refills: 0 | Status: SHIPPED | OUTPATIENT
Start: 2023-01-31 | End: 2023-05-01

## 2023-01-31 RX ORDER — OXYBUTYNIN CHLORIDE 5 MG/1
5 TABLET, EXTENDED RELEASE ORAL DAILY
Qty: 90 TABLET | Refills: 0 | Status: SHIPPED | OUTPATIENT
Start: 2023-01-31 | End: 2023-05-01

## 2023-01-31 RX ORDER — ATORVASTATIN CALCIUM 20 MG/1
20 TABLET, FILM COATED ORAL NIGHTLY
Qty: 90 TABLET | Refills: 0 | Status: SHIPPED | OUTPATIENT
Start: 2023-01-31

## 2023-01-31 ASSESSMENT — PATIENT HEALTH QUESTIONNAIRE - PHQ9
10. IF YOU CHECKED OFF ANY PROBLEMS, HOW DIFFICULT HAVE THESE PROBLEMS MADE IT FOR YOU TO DO YOUR WORK, TAKE CARE OF THINGS AT HOME, OR GET ALONG WITH OTHER PEOPLE: 0
4. FEELING TIRED OR HAVING LITTLE ENERGY: 0
SUM OF ALL RESPONSES TO PHQ QUESTIONS 1-9: 2
SUM OF ALL RESPONSES TO PHQ QUESTIONS 1-9: 2
6. FEELING BAD ABOUT YOURSELF - OR THAT YOU ARE A FAILURE OR HAVE LET YOURSELF OR YOUR FAMILY DOWN: 0
9. THOUGHTS THAT YOU WOULD BE BETTER OFF DEAD, OR OF HURTING YOURSELF: 0
2. FEELING DOWN, DEPRESSED OR HOPELESS: 2
3. TROUBLE FALLING OR STAYING ASLEEP: 0
7. TROUBLE CONCENTRATING ON THINGS, SUCH AS READING THE NEWSPAPER OR WATCHING TELEVISION: 0
SUM OF ALL RESPONSES TO PHQ QUESTIONS 1-9: 2
SUM OF ALL RESPONSES TO PHQ9 QUESTIONS 1 & 2: 2
SUM OF ALL RESPONSES TO PHQ QUESTIONS 1-9: 2
5. POOR APPETITE OR OVEREATING: 0
8. MOVING OR SPEAKING SO SLOWLY THAT OTHER PEOPLE COULD HAVE NOTICED. OR THE OPPOSITE, BEING SO FIGETY OR RESTLESS THAT YOU HAVE BEEN MOVING AROUND A LOT MORE THAN USUAL: 0
1. LITTLE INTEREST OR PLEASURE IN DOING THINGS: 0

## 2023-02-06 DIAGNOSIS — F51.01 PRIMARY INSOMNIA: ICD-10-CM

## 2023-02-06 RX ORDER — METOPROLOL SUCCINATE 25 MG/1
12.5 TABLET, EXTENDED RELEASE ORAL 3 TIMES DAILY PRN
Qty: 30 TABLET | Refills: 3 | Status: SHIPPED | OUTPATIENT
Start: 2023-02-06

## 2023-02-06 RX ORDER — LEVOTHYROXINE SODIUM 0.05 MG/1
50 TABLET ORAL DAILY
Qty: 90 TABLET | Refills: 0 | Status: SHIPPED | OUTPATIENT
Start: 2023-02-06

## 2023-02-06 RX ORDER — GABAPENTIN 100 MG/1
100 CAPSULE ORAL 2 TIMES DAILY
Qty: 180 CAPSULE | Refills: 0 | Status: SHIPPED | OUTPATIENT
Start: 2023-02-06 | End: 2023-05-07

## 2023-02-06 RX ORDER — ATORVASTATIN CALCIUM 20 MG/1
20 TABLET, FILM COATED ORAL NIGHTLY
Qty: 90 TABLET | Refills: 0 | Status: SHIPPED | OUTPATIENT
Start: 2023-02-06

## 2023-02-06 RX ORDER — GABAPENTIN 300 MG/1
300 CAPSULE ORAL DAILY
Qty: 90 CAPSULE | Refills: 0 | Status: SHIPPED | OUTPATIENT
Start: 2023-02-06 | End: 2023-05-07

## 2023-02-06 RX ORDER — OXYBUTYNIN CHLORIDE 5 MG/1
5 TABLET, EXTENDED RELEASE ORAL DAILY
Qty: 90 TABLET | Refills: 0 | Status: SHIPPED | OUTPATIENT
Start: 2023-02-06 | End: 2023-05-07

## 2023-02-06 RX ORDER — CITALOPRAM 20 MG/1
20 TABLET ORAL DAILY
Qty: 90 TABLET | Refills: 0 | Status: SHIPPED | OUTPATIENT
Start: 2023-02-06

## 2023-02-09 ENCOUNTER — HOSPITAL ENCOUNTER (OUTPATIENT)
Dept: MAMMOGRAPHY | Age: 88
Discharge: HOME | End: 2023-02-11
Payer: MEDICARE

## 2023-02-09 DIAGNOSIS — Z12.31 VISIT FOR SCREENING MAMMOGRAM: ICD-10-CM

## 2023-02-09 PROCEDURE — 77067 SCR MAMMO BI INCL CAD: CPT

## 2023-03-06 ENCOUNTER — CASE MANAGEMENT (OUTPATIENT)
Dept: GENERAL RADIOLOGY | Age: 88
End: 2023-03-06

## 2023-03-28 RX ORDER — APIXABAN 5 MG/1
TABLET, FILM COATED ORAL
Qty: 180 TABLET | Refills: 0 | Status: SHIPPED | OUTPATIENT
Start: 2023-03-28

## 2023-03-31 RX ORDER — APIXABAN 5 MG/1
TABLET, FILM COATED ORAL
Qty: 180 TABLET | Refills: 0 | OUTPATIENT
Start: 2023-03-31

## 2023-04-10 ASSESSMENT — ENCOUNTER SYMPTOMS
DIARRHEA: 0
SORE THROAT: 0
SHORTNESS OF BREATH: 0
VOMITING: 0
APNEA: 0
PHOTOPHOBIA: 0
WHEEZING: 0
SINUS PAIN: 0
ABDOMINAL PAIN: 0
EYE DISCHARGE: 0
BACK PAIN: 0
CONSTIPATION: 0
TROUBLE SWALLOWING: 0
NAUSEA: 0
ABDOMINAL DISTENTION: 0
EYE ITCHING: 0
COUGH: 0
BLOOD IN STOOL: 0

## 2023-05-09 ENCOUNTER — OFFICE VISIT (OUTPATIENT)
Dept: PRIMARY CARE CLINIC | Age: 88
End: 2023-05-09
Payer: MEDICARE

## 2023-05-09 VITALS
BODY MASS INDEX: 29.8 KG/M2 | TEMPERATURE: 97.8 F | HEART RATE: 86 BPM | SYSTOLIC BLOOD PRESSURE: 110 MMHG | WEIGHT: 168.2 LBS | HEIGHT: 63 IN | DIASTOLIC BLOOD PRESSURE: 70 MMHG | OXYGEN SATURATION: 96 %

## 2023-05-09 DIAGNOSIS — E78.2 MIXED HYPERLIPIDEMIA: ICD-10-CM

## 2023-05-09 DIAGNOSIS — R73.01 IMPAIRED FASTING GLUCOSE: ICD-10-CM

## 2023-05-09 DIAGNOSIS — F51.01 PRIMARY INSOMNIA: ICD-10-CM

## 2023-05-09 DIAGNOSIS — I48.0 PAROXYSMAL ATRIAL FIBRILLATION (HCC): ICD-10-CM

## 2023-05-09 DIAGNOSIS — E03.9 ACQUIRED HYPOTHYROIDISM: ICD-10-CM

## 2023-05-09 DIAGNOSIS — I47.1 SVT (SUPRAVENTRICULAR TACHYCARDIA) (HCC): Primary | ICD-10-CM

## 2023-05-09 PROBLEM — I47.10 SVT (SUPRAVENTRICULAR TACHYCARDIA): Status: ACTIVE | Noted: 2023-05-09

## 2023-05-09 PROCEDURE — 99214 OFFICE O/P EST MOD 30 MIN: CPT | Performed by: INTERNAL MEDICINE

## 2023-05-09 PROCEDURE — 1123F ACP DISCUSS/DSCN MKR DOCD: CPT | Performed by: INTERNAL MEDICINE

## 2023-05-09 RX ORDER — GABAPENTIN 300 MG/1
300 CAPSULE ORAL DAILY
Qty: 90 CAPSULE | Refills: 0 | Status: SHIPPED | OUTPATIENT
Start: 2023-05-09 | End: 2023-08-07

## 2023-05-09 RX ORDER — METOPROLOL SUCCINATE 25 MG/1
12.5 TABLET, EXTENDED RELEASE ORAL 3 TIMES DAILY PRN
Qty: 30 TABLET | Refills: 3 | Status: SHIPPED | OUTPATIENT
Start: 2023-05-09

## 2023-05-09 RX ORDER — CITALOPRAM 20 MG/1
20 TABLET ORAL DAILY
Qty: 90 TABLET | Refills: 0 | Status: SHIPPED | OUTPATIENT
Start: 2023-05-09

## 2023-05-09 RX ORDER — KETOCONAZOLE 20 MG/ML
SHAMPOO TOPICAL
COMMUNITY
Start: 2023-01-31

## 2023-05-09 RX ORDER — FLUOCINONIDE TOPICAL SOLUTION USP, 0.05% 0.5 MG/ML
SOLUTION TOPICAL
COMMUNITY
Start: 2023-01-31

## 2023-05-09 RX ORDER — OXYBUTYNIN CHLORIDE 5 MG/1
5 TABLET, EXTENDED RELEASE ORAL DAILY
Qty: 90 TABLET | Refills: 0 | Status: SHIPPED | OUTPATIENT
Start: 2023-05-09 | End: 2023-08-07

## 2023-05-09 RX ORDER — ATORVASTATIN CALCIUM 20 MG/1
20 TABLET, FILM COATED ORAL NIGHTLY
Qty: 90 TABLET | Refills: 0 | Status: SHIPPED | OUTPATIENT
Start: 2023-05-09

## 2023-05-09 RX ORDER — GABAPENTIN 100 MG/1
100 CAPSULE ORAL 2 TIMES DAILY
Qty: 180 CAPSULE | Refills: 0 | Status: SHIPPED | OUTPATIENT
Start: 2023-05-09 | End: 2023-08-07

## 2023-05-09 RX ORDER — LEVOTHYROXINE SODIUM 0.05 MG/1
50 TABLET ORAL DAILY
Qty: 90 TABLET | Refills: 0 | Status: SHIPPED | OUTPATIENT
Start: 2023-05-09

## 2023-05-09 SDOH — ECONOMIC STABILITY: FOOD INSECURITY: WITHIN THE PAST 12 MONTHS, THE FOOD YOU BOUGHT JUST DIDN'T LAST AND YOU DIDN'T HAVE MONEY TO GET MORE.: NEVER TRUE

## 2023-05-09 SDOH — ECONOMIC STABILITY: FOOD INSECURITY: WITHIN THE PAST 12 MONTHS, YOU WORRIED THAT YOUR FOOD WOULD RUN OUT BEFORE YOU GOT MONEY TO BUY MORE.: NEVER TRUE

## 2023-05-09 SDOH — ECONOMIC STABILITY: HOUSING INSECURITY
IN THE LAST 12 MONTHS, WAS THERE A TIME WHEN YOU DID NOT HAVE A STEADY PLACE TO SLEEP OR SLEPT IN A SHELTER (INCLUDING NOW)?: NO

## 2023-05-09 SDOH — ECONOMIC STABILITY: INCOME INSECURITY: HOW HARD IS IT FOR YOU TO PAY FOR THE VERY BASICS LIKE FOOD, HOUSING, MEDICAL CARE, AND HEATING?: NOT HARD AT ALL

## 2023-06-09 ENCOUNTER — TELEPHONE (OUTPATIENT)
Dept: PRIMARY CARE CLINIC | Age: 88
End: 2023-06-09

## 2023-06-09 DIAGNOSIS — J32.9 CHRONIC SINUSITIS, UNSPECIFIED LOCATION: Primary | ICD-10-CM

## 2023-06-19 LAB
ALT SERPL-CCNC: 17 U/L (ref 0–32)
AST SERPL-CCNC: 31 U/L (ref 0–31)
BASOPHILS # BLD: 0.04 E9/L (ref 0–0.2)
BASOPHILS NFR BLD: 0.8 % (ref 0–2)
EOSINOPHIL # BLD: 0.11 E9/L (ref 0.05–0.5)
EOSINOPHIL NFR BLD: 2.2 % (ref 0–6)
ERYTHROCYTE [DISTWIDTH] IN BLOOD BY AUTOMATED COUNT: 14.1 FL (ref 11.5–15)
HCT VFR BLD AUTO: 42.4 % (ref 34–48)
HGB BLD-MCNC: 13 G/DL (ref 11.5–15.5)
IMM GRANULOCYTES # BLD: 0.02 E9/L
IMM GRANULOCYTES NFR BLD: 0.4 % (ref 0–5)
LYMPHOCYTES # BLD: 1.09 E9/L (ref 1.5–4)
LYMPHOCYTES NFR BLD: 21.5 % (ref 20–42)
MCH RBC QN AUTO: 29.3 PG (ref 26–35)
MCHC RBC AUTO-ENTMCNC: 30.7 % (ref 32–34.5)
MCV RBC AUTO: 95.7 FL (ref 80–99.9)
MONOCYTES # BLD: 0.46 E9/L (ref 0.1–0.95)
MONOCYTES NFR BLD: 9.1 % (ref 2–12)
NEUTROPHILS # BLD: 3.36 E9/L (ref 1.8–7.3)
NEUTS SEG NFR BLD: 66 % (ref 43–80)
PLATELET # BLD AUTO: 266 E9/L (ref 130–450)
PMV BLD AUTO: 10.6 FL (ref 7–12)
RBC # BLD AUTO: 4.43 E12/L (ref 3.5–5.5)
WBC # BLD: 5.1 E9/L (ref 4.5–11.5)

## 2023-08-02 ENCOUNTER — OFFICE VISIT (OUTPATIENT)
Dept: ENT CLINIC | Age: 88
End: 2023-08-02
Payer: MEDICARE

## 2023-08-02 ENCOUNTER — PROCEDURE VISIT (OUTPATIENT)
Dept: AUDIOLOGY | Age: 88
End: 2023-08-02
Payer: MEDICARE

## 2023-08-02 VITALS
HEART RATE: 59 BPM | DIASTOLIC BLOOD PRESSURE: 75 MMHG | HEIGHT: 63 IN | WEIGHT: 172 LBS | BODY MASS INDEX: 30.48 KG/M2 | SYSTOLIC BLOOD PRESSURE: 150 MMHG

## 2023-08-02 DIAGNOSIS — R09.82 POST-NASAL DRAINAGE: ICD-10-CM

## 2023-08-02 DIAGNOSIS — H93.8X3 EAR FULLNESS, BILATERAL: Primary | ICD-10-CM

## 2023-08-02 DIAGNOSIS — H93.8X3 SENSATION OF FULLNESS IN BOTH EARS: Primary | ICD-10-CM

## 2023-08-02 PROCEDURE — 92567 TYMPANOMETRY: CPT | Performed by: AUDIOLOGIST

## 2023-08-02 PROCEDURE — 99203 OFFICE O/P NEW LOW 30 MIN: CPT | Performed by: NURSE PRACTITIONER

## 2023-08-02 PROCEDURE — 1123F ACP DISCUSS/DSCN MKR DOCD: CPT | Performed by: NURSE PRACTITIONER

## 2023-08-02 RX ORDER — AZELASTINE 1 MG/ML
1-2 SPRAY, METERED NASAL 2 TIMES DAILY PRN
Qty: 30 ML | Refills: 1 | Status: SHIPPED | OUTPATIENT
Start: 2023-08-02

## 2023-08-02 ASSESSMENT — ENCOUNTER SYMPTOMS
STRIDOR: 0
SINUS PAIN: 0
RHINORRHEA: 1
EYES NEGATIVE: 1
RESPIRATORY NEGATIVE: 1
SINUS PRESSURE: 0
SHORTNESS OF BREATH: 0

## 2023-08-02 NOTE — PROGRESS NOTES
This patient was referred for tympanometric testing by JOSÉ Rivas due to ear fullness, bilaterally. Tympanometry revealed normal middle ear peak pressure and compliance, bilaterally. The results were reviewed with the patient. Recommendations for follow up will be made pending physician consult.     Suze Hare CCC/OTIS  Audiologist  [id number]  NPI#:  [id number]      Electronically signed by Dangelo Espinosa on 8/2/2023 at 4:14 PM

## 2023-08-02 NOTE — PROGRESS NOTES
[facility]  Dr. Avery Nick D.O. Ms.Ed. New Consult       Patient Name:  Brenadn Ornelas  :  1934     CHIEF C/O:    Chief Complaint   Patient presents with    New Patient     Pt states she has been having post nasal drip constantly, pt also states bilateral ears are plugged. HISTORY OBTAINED FROM:  patient    HISTORY OF PRESENT ILLNESS:       Santos England is a 80y.o. year old female, here today for:       Post nasal drainage and ear fullness. Symptoms for many years, worsening for the past 2-3 years  States constant PND, worse at night when laying flat, causes cough  Also persistent rhinorrhea without congestion  Has been on several allergy treatments from PCP  Currently using nasacort with minimal relief  Intermittent sinus pressure in maxillary sinuses  No previous sinus imaging or surgeries  No recent fevers or antibiotics  Previous allergy testing, environmental allergies.   Also complains of chronic ear fullness  States intermittent fullness  Does have known hearing loss with hearing aids           Past Medical History:   Diagnosis Date    Atrial fibrillation (HCC)     Compression fracture of L2 ([address])     Depression     H/O cardiovascular stress test 2021    Lexiscan    Hyperlipidemia     Hypertension     Hypothyroidism     Thyroid disease      Past Surgical History:   Procedure Laterality Date    APPENDECTOMY      BACK SURGERY      BREAST REDUCTION SURGERY      BREAST SURGERY      CHOLECYSTECTOMY      HERNIA REPAIR      HYSTERECTOMY (CERVIX STATUS UNKNOWN)      HYSTERECTOMY, TOTAL ABDOMINAL (CERVIX REMOVED)      TONSILLECTOMY AND ADENOIDECTOMY         Current Outpatient Medications:     ketoconazole (NIZORAL) 2 % shampoo, apply 1 application topically twice per week allow to sit for 5 minutes before rinsing, Disp: , Rfl:     fluocinonide (LIDEX) 0.05 % external solution, apply to itchy areas on scalp two times a day as needed, Disp: , Rfl:     oxybutynin (DITROPAN-XL) 5

## 2023-08-08 DIAGNOSIS — I47.10 SVT (SUPRAVENTRICULAR TACHYCARDIA): ICD-10-CM

## 2023-08-08 DIAGNOSIS — E78.2 MIXED HYPERLIPIDEMIA: ICD-10-CM

## 2023-08-08 DIAGNOSIS — R73.01 IMPAIRED FASTING GLUCOSE: ICD-10-CM

## 2023-08-08 DIAGNOSIS — E03.9 ACQUIRED HYPOTHYROIDISM: ICD-10-CM

## 2023-08-08 LAB
ABSOLUTE IMMATURE GRANULOCYTE: <0.03 K/UL (ref 0–0.58)
ALBUMIN SERPL-MCNC: 3.9 G/DL (ref 3.5–5.2)
ALP BLD-CCNC: 107 U/L (ref 35–104)
ALT SERPL-CCNC: 15 U/L (ref 0–32)
ANION GAP SERPL CALCULATED.3IONS-SCNC: 14 MMOL/L (ref 7–16)
AST SERPL-CCNC: 26 U/L (ref 0–31)
BASOPHILS ABSOLUTE: 0.03 K/UL (ref 0–0.2)
BASOPHILS RELATIVE PERCENT: 1 % (ref 0–2)
BILIRUB SERPL-MCNC: 0.5 MG/DL (ref 0–1.2)
BUN BLDV-MCNC: 25 MG/DL (ref 6–23)
CALCIUM SERPL-MCNC: 9.7 MG/DL (ref 8.6–10.2)
CHLORIDE BLD-SCNC: 104 MMOL/L (ref 98–107)
CHOLESTEROL, FASTING: 144 MG/DL
CO2: 22 MMOL/L (ref 22–29)
CREAT SERPL-MCNC: 1.2 MG/DL (ref 0.5–1)
EOSINOPHILS ABSOLUTE: 0.18 K/UL (ref 0.05–0.5)
EOSINOPHILS RELATIVE PERCENT: 4 % (ref 0–6)
GFR SERPL CREATININE-BSD FRML MDRD: 45 ML/MIN/1.73M2
GLUCOSE BLD-MCNC: 107 MG/DL (ref 74–99)
HBA1C MFR BLD: 5.8 % (ref 4–5.6)
HCT VFR BLD CALC: 42.4 % (ref 34–48)
HDLC SERPL-MCNC: 81 MG/DL
HEMOGLOBIN: 13.1 G/DL (ref 11.5–15.5)
IMMATURE GRANULOCYTES: 0 % (ref 0–5)
LDL CHOLESTEROL: 47 MG/DL
LYMPHOCYTES ABSOLUTE: 1.12 K/UL (ref 1.5–4)
LYMPHOCYTES RELATIVE PERCENT: 24 % (ref 20–42)
MCH RBC QN AUTO: 29.6 PG (ref 26–35)
MCHC RBC AUTO-ENTMCNC: 30.9 G/DL (ref 32–34.5)
MCV RBC AUTO: 95.9 FL (ref 80–99.9)
MONOCYTES ABSOLUTE: 0.41 K/UL (ref 0.1–0.95)
MONOCYTES RELATIVE PERCENT: 9 % (ref 2–12)
NEUTROPHILS ABSOLUTE: 3 K/UL (ref 1.8–7.3)
NEUTROPHILS RELATIVE PERCENT: 63 % (ref 43–80)
PDW BLD-RTO: 14.2 % (ref 11.5–15)
PLATELET # BLD: 261 K/UL (ref 130–450)
PMV BLD AUTO: 10.9 FL (ref 7–12)
POTASSIUM SERPL-SCNC: 4.5 MMOL/L (ref 3.5–5)
RBC # BLD: 4.42 M/UL (ref 3.5–5.5)
SODIUM BLD-SCNC: 140 MMOL/L (ref 132–146)
T4 FREE: 1.3 NG/DL (ref 0.9–1.7)
TOTAL PROTEIN: 6.9 G/DL (ref 6.4–8.3)
TRIGLYCERIDE, FASTING: 78 MG/DL
TSH SERPL DL<=0.05 MIU/L-ACNC: 0.65 UIU/ML (ref 0.27–4.2)
VLDLC SERPL CALC-MCNC: 16 MG/DL
WBC # BLD: 4.8 K/UL (ref 4.5–11.5)

## 2023-08-21 ASSESSMENT — ENCOUNTER SYMPTOMS
APNEA: 0
SHORTNESS OF BREATH: 0
PHOTOPHOBIA: 0
CONSTIPATION: 0
SORE THROAT: 0
WHEEZING: 0
ABDOMINAL PAIN: 0
BACK PAIN: 0
DIARRHEA: 0
BLOOD IN STOOL: 0
ABDOMINAL DISTENTION: 0
NAUSEA: 0
COUGH: 0
TROUBLE SWALLOWING: 0
EYE ITCHING: 0
VOMITING: 0
SINUS PAIN: 0
EYE DISCHARGE: 0

## 2023-08-22 ENCOUNTER — OFFICE VISIT (OUTPATIENT)
Dept: PRIMARY CARE CLINIC | Age: 88
End: 2023-08-22
Payer: MEDICARE

## 2023-08-22 VITALS
TEMPERATURE: 97.9 F | DIASTOLIC BLOOD PRESSURE: 72 MMHG | BODY MASS INDEX: 30.83 KG/M2 | WEIGHT: 174 LBS | HEIGHT: 63 IN | SYSTOLIC BLOOD PRESSURE: 130 MMHG | OXYGEN SATURATION: 95 % | HEART RATE: 80 BPM

## 2023-08-22 DIAGNOSIS — E03.9 ACQUIRED HYPOTHYROIDISM: ICD-10-CM

## 2023-08-22 DIAGNOSIS — E78.2 MIXED HYPERLIPIDEMIA: ICD-10-CM

## 2023-08-22 DIAGNOSIS — I25.10 ATHEROSCLEROSIS OF NATIVE CORONARY ARTERY OF NATIVE HEART WITHOUT ANGINA PECTORIS: ICD-10-CM

## 2023-08-22 DIAGNOSIS — N18.31 STAGE 3A CHRONIC KIDNEY DISEASE (HCC): ICD-10-CM

## 2023-08-22 DIAGNOSIS — R73.01 IMPAIRED FASTING GLUCOSE: ICD-10-CM

## 2023-08-22 DIAGNOSIS — F51.01 PRIMARY INSOMNIA: ICD-10-CM

## 2023-08-22 DIAGNOSIS — I48.0 PAROXYSMAL ATRIAL FIBRILLATION (HCC): Primary | ICD-10-CM

## 2023-08-22 PROCEDURE — 1123F ACP DISCUSS/DSCN MKR DOCD: CPT | Performed by: INTERNAL MEDICINE

## 2023-08-22 PROCEDURE — 99214 OFFICE O/P EST MOD 30 MIN: CPT | Performed by: INTERNAL MEDICINE

## 2023-08-22 RX ORDER — TERBINAFINE HYDROCHLORIDE 250 MG/1
TABLET ORAL
COMMUNITY
Start: 2023-07-19

## 2023-08-22 RX ORDER — GABAPENTIN 300 MG/1
300 CAPSULE ORAL DAILY
Qty: 90 CAPSULE | Refills: 0 | Status: SHIPPED | OUTPATIENT
Start: 2023-08-22 | End: 2023-11-20

## 2023-08-22 RX ORDER — ATORVASTATIN CALCIUM 20 MG/1
20 TABLET, FILM COATED ORAL NIGHTLY
Qty: 90 TABLET | Refills: 0 | Status: SHIPPED | OUTPATIENT
Start: 2023-08-22

## 2023-09-12 RX ORDER — LEVOTHYROXINE SODIUM 0.05 MG/1
50 TABLET ORAL DAILY
Qty: 90 TABLET | Refills: 2 | Status: SHIPPED | OUTPATIENT
Start: 2023-09-12

## 2023-10-02 RX ORDER — ATORVASTATIN CALCIUM 20 MG/1
20 TABLET, FILM COATED ORAL NIGHTLY
Qty: 90 TABLET | Refills: 0 | Status: SHIPPED | OUTPATIENT
Start: 2023-10-02

## 2023-10-26 ENCOUNTER — PROCEDURE VISIT (OUTPATIENT)
Dept: AUDIOLOGY | Age: 88
End: 2023-10-26
Payer: MEDICARE

## 2023-10-26 ENCOUNTER — OFFICE VISIT (OUTPATIENT)
Dept: ENT CLINIC | Age: 88
End: 2023-10-26
Payer: MEDICARE

## 2023-10-26 VITALS
WEIGHT: 171.2 LBS | BODY MASS INDEX: 30.33 KG/M2 | HEIGHT: 63 IN | HEART RATE: 64 BPM | DIASTOLIC BLOOD PRESSURE: 62 MMHG | SYSTOLIC BLOOD PRESSURE: 150 MMHG

## 2023-10-26 DIAGNOSIS — H93.8X3 EAR FULLNESS, BILATERAL: Primary | ICD-10-CM

## 2023-10-26 DIAGNOSIS — H61.21 IMPACTED CERUMEN OF RIGHT EAR: ICD-10-CM

## 2023-10-26 DIAGNOSIS — R09.82 POST-NASAL DRAINAGE: ICD-10-CM

## 2023-10-26 DIAGNOSIS — H90.3 SENSORY HEARING LOSS, BILATERAL: Primary | ICD-10-CM

## 2023-10-26 PROCEDURE — 99213 OFFICE O/P EST LOW 20 MIN: CPT | Performed by: NURSE PRACTITIONER

## 2023-10-26 PROCEDURE — 92557 COMPREHENSIVE HEARING TEST: CPT | Performed by: AUDIOLOGIST

## 2023-10-26 PROCEDURE — 1123F ACP DISCUSS/DSCN MKR DOCD: CPT | Performed by: NURSE PRACTITIONER

## 2023-10-26 PROCEDURE — 69210 REMOVE IMPACTED EAR WAX UNI: CPT | Performed by: NURSE PRACTITIONER

## 2023-10-26 PROCEDURE — 92567 TYMPANOMETRY: CPT | Performed by: AUDIOLOGIST

## 2023-10-26 RX ORDER — FLUTICASONE PROPIONATE 50 MCG
2 SPRAY, SUSPENSION (ML) NASAL DAILY
Qty: 16 G | Refills: 1 | Status: SHIPPED | OUTPATIENT
Start: 2023-10-26

## 2023-10-26 RX ORDER — AZELASTINE 1 MG/ML
1-2 SPRAY, METERED NASAL 2 TIMES DAILY PRN
Qty: 30 ML | Refills: 1 | Status: SHIPPED | OUTPATIENT
Start: 2023-10-26

## 2023-10-26 ASSESSMENT — ENCOUNTER SYMPTOMS
RHINORRHEA: 0
SHORTNESS OF BREATH: 0
EYES NEGATIVE: 1
STRIDOR: 0
SINUS PAIN: 0
SINUS PRESSURE: 0
RESPIRATORY NEGATIVE: 1

## 2023-10-26 NOTE — PROGRESS NOTES
This patient was referred for audiometric and tympanometric testing by JOSÉ Wasserman due to hearing loss. She noted wanting wax taken out of her right ear. She has had an asymmetrical hearing loss for years  L>R. She wears bilateral CIC hearing aids from [facility]. Otoscopic inspection revealed minimal wax in the right ear and a clear ear canal for the left ear. Audiometry using pure tone air and bone conduction testing revealed a mild/moderate-to-severe  sensorineural hearing loss, bilaterally. Asymmetry noted across frequencies for the left ear. Reliability was good. Speech reception thresholds were in good agreement with the pure tone averages, bilaterally. Speech discrimination scores were excellent, in the right ear and good in the left ear. Tympanometry revealed normal middle ear peak pressure and compliance, in the left ear and negative pressure -160 daPa in the right ear. The results were reviewed with the patient. Recommendations for follow up will be made pending physician consult.     Electronically signed by Dangelo Silverman on 10/26/2023 at 1:14 PM

## 2023-10-26 NOTE — PROGRESS NOTES
directed, Disp: 30 mL, Rfl: 1    atorvastatin (LIPITOR) 20 MG tablet, Take 1 tablet by mouth nightly, Disp: 90 tablet, Rfl: 0    levothyroxine (SYNTHROID) 50 MCG tablet, Take 1 tablet by mouth Daily, Disp: 90 tablet, Rfl: 2    terbinafine (LAMISIL) 250 MG tablet, TAKE ONE TABLET BY MOUTH ONCE PER DAY, Disp: , Rfl:     gabapentin (NEURONTIN) 300 MG capsule, Take 1 capsule by mouth daily for 90 days. , Disp: 90 capsule, Rfl: 0    apixaban (ELIQUIS) 2.5 MG TABS tablet, Take 1 tablet by mouth 2 times daily, Disp: 180 tablet, Rfl: 0    ketoconazole (NIZORAL) 2 % shampoo, apply 1 application topically twice per week allow to sit for 5 minutes before rinsing, Disp: , Rfl:     fluocinonide (LIDEX) 0.05 % external solution, apply to itchy areas on scalp two times a day as needed, Disp: , Rfl:     metoprolol succinate (TOPROL XL) 25 MG extended release tablet, Take 0.5 tablets by mouth 3 times daily as needed (palpitation), Disp: 30 tablet, Rfl: 3    citalopram (CELEXA) 20 MG tablet, Take 1 tablet by mouth daily, Disp: 90 tablet, Rfl: 0    Vitamin D, Cholecalciferol, 1000 units TABS, Take by mouth, Disp: , Rfl:     cyanocobalamin 500 MCG tablet, Take by mouth, Disp: , Rfl:     oxybutynin (DITROPAN-XL) 5 MG extended release tablet, Take 1 tablet by mouth daily, Disp: 90 tablet, Rfl: 0    gabapentin (NEURONTIN) 100 MG capsule, Take 1 capsule by mouth 2 times daily for 90 days. , Disp: 180 capsule, Rfl: 0  Caffeine, Erythromycin, Macrodantin [nitrofurantoin], and Codeine  Social History     Tobacco Use    Smoking status: Never    Smokeless tobacco: Never   Substance Use Topics    Alcohol use: Yes     Comment: social    Drug use: Never     Family History   Problem Relation Age of Onset    Melanoma Mother     Colon Cancer Father        Review of Systems   Constitutional: Negative. Negative for activity change and appetite change. HENT:  Positive for ear pain (bilateral ear fullness) and hearing loss.  Negative for congestion, ear

## 2023-11-12 ENCOUNTER — APPOINTMENT (OUTPATIENT)
Dept: GENERAL RADIOLOGY | Age: 88
End: 2023-11-12
Payer: MEDICARE

## 2023-11-12 ENCOUNTER — HOSPITAL ENCOUNTER (EMERGENCY)
Age: 88
Discharge: HOME | End: 2023-11-12
Payer: MEDICARE

## 2023-11-12 VITALS
SYSTOLIC BLOOD PRESSURE: 151 MMHG | HEART RATE: 66 BPM | DIASTOLIC BLOOD PRESSURE: 67 MMHG | RESPIRATION RATE: 18 BRPM | TEMPERATURE: 97.3 F | WEIGHT: 170 LBS | BODY MASS INDEX: 30.12 KG/M2 | OXYGEN SATURATION: 98 %

## 2023-11-12 DIAGNOSIS — S93.401A SPRAIN OF RIGHT ANKLE, UNSPECIFIED LIGAMENT, INITIAL ENCOUNTER: Primary | ICD-10-CM

## 2023-11-12 PROCEDURE — 6370000000 HC RX 637 (ALT 250 FOR IP): Performed by: PHYSICIAN ASSISTANT

## 2023-11-12 PROCEDURE — 99283 EMERGENCY DEPT VISIT LOW MDM: CPT

## 2023-11-12 PROCEDURE — 73630 X-RAY EXAM OF FOOT: CPT

## 2023-11-12 PROCEDURE — 73610 X-RAY EXAM OF ANKLE: CPT

## 2023-11-12 RX ORDER — ACETAMINOPHEN 500 MG
1000 TABLET ORAL ONCE
Status: COMPLETED | OUTPATIENT
Start: 2023-11-12 | End: 2023-11-12

## 2023-11-12 RX ADMIN — ACETAMINOPHEN 1000 MG: 500 TABLET ORAL at 17:11

## 2023-11-12 ASSESSMENT — LIFESTYLE VARIABLES
HOW OFTEN DO YOU HAVE A DRINK CONTAINING ALCOHOL: NEVER
HOW MANY STANDARD DRINKS CONTAINING ALCOHOL DO YOU HAVE ON A TYPICAL DAY: PATIENT DOES NOT DRINK

## 2023-11-12 ASSESSMENT — PAIN SCALES - GENERAL: PAINLEVEL_OUTOF10: 9

## 2023-11-12 NOTE — ED NOTES
Right ankle ace and air cast placed, home instructions given. Discharge instructions reviewed, patient verbalized understanding.       Preeti Diggs RN  11/12/23 [id number]

## 2023-11-17 ENCOUNTER — OFFICE VISIT (OUTPATIENT)
Dept: PRIMARY CARE CLINIC | Age: 88
End: 2023-11-17
Payer: MEDICARE

## 2023-11-17 VITALS
HEART RATE: 70 BPM | BODY MASS INDEX: 34.55 KG/M2 | OXYGEN SATURATION: 96 % | DIASTOLIC BLOOD PRESSURE: 80 MMHG | SYSTOLIC BLOOD PRESSURE: 130 MMHG | HEIGHT: 60 IN | WEIGHT: 176 LBS | TEMPERATURE: 97.6 F

## 2023-11-17 DIAGNOSIS — S96.911A SPRAIN AND STRAIN OF RIGHT ANKLE: Primary | ICD-10-CM

## 2023-11-17 DIAGNOSIS — S93.401A SPRAIN AND STRAIN OF RIGHT ANKLE: Primary | ICD-10-CM

## 2023-11-17 DIAGNOSIS — F51.01 PRIMARY INSOMNIA: ICD-10-CM

## 2023-11-17 PROCEDURE — 99213 OFFICE O/P EST LOW 20 MIN: CPT | Performed by: INTERNAL MEDICINE

## 2023-11-17 PROCEDURE — 1123F ACP DISCUSS/DSCN MKR DOCD: CPT | Performed by: INTERNAL MEDICINE

## 2023-11-17 RX ORDER — GABAPENTIN 100 MG/1
100 CAPSULE ORAL 2 TIMES DAILY
Qty: 180 CAPSULE | Refills: 0 | Status: SHIPPED | OUTPATIENT
Start: 2023-11-17 | End: 2024-02-15

## 2023-11-17 RX ORDER — GABAPENTIN 300 MG/1
300 CAPSULE ORAL DAILY
Qty: 90 CAPSULE | Refills: 0 | Status: SHIPPED | OUTPATIENT
Start: 2023-11-17 | End: 2024-02-15

## 2023-11-17 RX ORDER — TRAMADOL HYDROCHLORIDE 50 MG/1
50 TABLET ORAL EVERY 4 HOURS PRN
Qty: 30 TABLET | Refills: 0 | Status: SHIPPED | OUTPATIENT
Start: 2023-11-17 | End: 2023-11-22

## 2023-11-17 RX ORDER — ACETAMINOPHEN 500 MG
1000 TABLET ORAL EVERY 8 HOURS PRN
Status: SHIPPED | OUTPATIENT
Start: 2023-11-17

## 2023-11-17 RX ORDER — CITALOPRAM 20 MG/1
20 TABLET ORAL DAILY
Qty: 90 TABLET | Refills: 0 | Status: SHIPPED | OUTPATIENT
Start: 2023-11-17

## 2023-11-17 RX ORDER — TERBINAFINE HYDROCHLORIDE 250 MG/1
TABLET ORAL
Qty: 90 TABLET | Refills: 0 | Status: SHIPPED | OUTPATIENT
Start: 2023-11-17

## 2023-11-17 RX ORDER — OXYBUTYNIN CHLORIDE 5 MG/1
5 TABLET, EXTENDED RELEASE ORAL DAILY
Qty: 90 TABLET | Refills: 0 | Status: SHIPPED | OUTPATIENT
Start: 2023-11-17 | End: 2024-02-15

## 2023-11-17 ASSESSMENT — ENCOUNTER SYMPTOMS
EYE PAIN: 0
RESPIRATORY NEGATIVE: 1
EYES NEGATIVE: 1
FACIAL SWELLING: 0
EYE REDNESS: 0
EYE ITCHING: 0
ALLERGIC/IMMUNOLOGIC NEGATIVE: 1
RHINORRHEA: 0
GASTROINTESTINAL NEGATIVE: 1
SINUS PAIN: 0
EYE DISCHARGE: 0

## 2023-11-17 NOTE — TELEPHONE ENCOUNTER
----- Message from Del Jacinto sent at 11/16/2023  2:34 PM EST -----  Subject: Refill Request    QUESTIONS  Name of Medication? citalopram (CELEXA) 20 MG tablet  Patient-reported dosage and instructions? 20MG, Once a day   How many days do you have left? 0  Preferred Pharmacy? [address] phone number (if available)? [phone]  Additional Information for Provider? Pt is requesting an urgent fill due   to being out of most of her medications. Please advise.   ---------------------------------------------------------------------------  --------------,  Name of Medication? gabapentin (NEURONTIN) 100 MG capsule  Patient-reported dosage and instructions? 100MG, as needed   How many days do you have left? 0  Preferred Pharmacy? [address] phone number (if available)? [phone]  ---------------------------------------------------------------------------  --------------,  Name of Medication? gabapentin (NEURONTIN) 300 MG capsule  Patient-reported dosage and instructions? 300MG, once a day   How many days do you have left? 0  Preferred Pharmacy? [address] phone number (if available)? [phone]  ---------------------------------------------------------------------------  --------------,  Name of Medication? oxybutynin (DITROPAN-XL) 5 MG extended release tablet  Patient-reported dosage and instructions? 5MG once a night   How many days do you have left? 0  Preferred Pharmacy? [address] phone number (if available)? [phone]  ---------------------------------------------------------------------------  --------------,  Name of Medication? terbinafine (LAMISIL) 250 MG tablet  Patient-reported dosage and instructions? 250Mg once a day   How many days do you have left? 0  Preferred Pharmacy? [address] phone number (if available)?

## 2023-11-17 NOTE — PROGRESS NOTES
Hepatitis B vaccine (1 of 3 - Risk 3-dose series) Never done    Annual Wellness Visit (AWV)  10/27/2023    COVID-19 Vaccine (7 - 2023-24 season) 11/20/2023    lski    Physical Exam  Constitutional:       General: She is not in acute distress. Appearance: Normal appearance. She is obese. HENT:      Head: Normocephalic and atraumatic. Right Ear: Tympanic membrane, ear canal and external ear normal.      Left Ear: Tympanic membrane, ear canal and external ear normal. There is no impacted cerumen. Nose: Nose normal. No congestion or rhinorrhea. Mouth/Throat:      Mouth: Mucous membranes are moist.      Pharynx: Oropharynx is clear. No oropharyngeal exudate or posterior oropharyngeal erythema. Eyes:      General: No scleral icterus. Right eye: No discharge. Left eye: No discharge. Pupils: Pupils are equal, round, and reactive to light. Neck:      Vascular: No carotid bruit. Cardiovascular:      Rate and Rhythm: Normal rate and regular rhythm. Pulses: Normal pulses. Heart sounds: No murmur heard. No gallop. Pulmonary:      Effort: Pulmonary effort is normal. No respiratory distress. Breath sounds: Normal breath sounds. No wheezing, rhonchi or rales. Chest:      Chest wall: No tenderness. Abdominal:      General: Bowel sounds are normal.      Palpations: Abdomen is soft. There is no mass. Tenderness: There is no abdominal tenderness. There is no guarding. Hernia: No hernia is present. Musculoskeletal:         General: No swelling, tenderness, deformity or signs of injury. Cervical back: Normal range of motion and neck supple. No rigidity or tenderness. Right lower leg: Edema present. Left lower leg: No edema. Comments: Hematoma around lateral side of right ankle at the lateral malleolus   Lymphadenopathy:      Cervical: No cervical adenopathy. Skin:     General: Skin is warm and dry.       Capillary Refill: Capillary

## 2023-11-20 ENCOUNTER — TELEPHONE (OUTPATIENT)
Dept: PRIMARY CARE CLINIC | Age: 88
End: 2023-11-20

## 2023-11-20 DIAGNOSIS — S93.401A SPRAIN AND STRAIN OF RIGHT ANKLE: Primary | ICD-10-CM

## 2023-11-20 DIAGNOSIS — S96.911A SPRAIN AND STRAIN OF RIGHT ANKLE: Primary | ICD-10-CM

## 2023-11-20 NOTE — TELEPHONE ENCOUNTER
Pt was here on Friday and was offered order for boot for sprained right ankle. Told  she believes her winter boot would work that she had at home and it didn't. Would like to  order for this.

## 2023-11-30 ENCOUNTER — OFFICE VISIT (OUTPATIENT)
Dept: PRIMARY CARE CLINIC | Age: 88
End: 2023-11-30
Payer: MEDICARE

## 2023-11-30 VITALS
HEIGHT: 60 IN | BODY MASS INDEX: 33.96 KG/M2 | TEMPERATURE: 97.8 F | DIASTOLIC BLOOD PRESSURE: 74 MMHG | WEIGHT: 173 LBS | OXYGEN SATURATION: 96 % | HEART RATE: 77 BPM | SYSTOLIC BLOOD PRESSURE: 126 MMHG

## 2023-11-30 DIAGNOSIS — I10 PRIMARY HYPERTENSION: ICD-10-CM

## 2023-11-30 DIAGNOSIS — I48.0 PAROXYSMAL ATRIAL FIBRILLATION (HCC): ICD-10-CM

## 2023-11-30 DIAGNOSIS — M25.571 ACUTE RIGHT ANKLE PAIN: ICD-10-CM

## 2023-11-30 DIAGNOSIS — R73.01 IMPAIRED FASTING GLUCOSE: ICD-10-CM

## 2023-11-30 DIAGNOSIS — F51.01 PRIMARY INSOMNIA: ICD-10-CM

## 2023-11-30 DIAGNOSIS — Z00.00 MEDICARE ANNUAL WELLNESS VISIT, SUBSEQUENT: Primary | ICD-10-CM

## 2023-11-30 DIAGNOSIS — E78.2 MIXED HYPERLIPIDEMIA: ICD-10-CM

## 2023-11-30 DIAGNOSIS — N18.31 STAGE 3A CHRONIC KIDNEY DISEASE (HCC): ICD-10-CM

## 2023-11-30 DIAGNOSIS — E03.9 ACQUIRED HYPOTHYROIDISM: ICD-10-CM

## 2023-11-30 LAB
ABSOLUTE IMMATURE GRANULOCYTE: 0.03 K/UL (ref 0–0.58)
ALBUMIN SERPL-MCNC: 3.9 G/DL (ref 3.5–5.2)
ALP BLD-CCNC: 119 U/L (ref 35–104)
ALT SERPL-CCNC: 19 U/L (ref 0–32)
ANION GAP SERPL CALCULATED.3IONS-SCNC: 15 MMOL/L (ref 7–16)
AST SERPL-CCNC: 28 U/L (ref 0–31)
BASOPHILS ABSOLUTE: 0.04 K/UL (ref 0–0.2)
BASOPHILS RELATIVE PERCENT: 1 % (ref 0–2)
BILIRUB SERPL-MCNC: 0.6 MG/DL (ref 0–1.2)
BUN BLDV-MCNC: 24 MG/DL (ref 6–23)
CALCIUM SERPL-MCNC: 10.1 MG/DL (ref 8.6–10.2)
CHLORIDE BLD-SCNC: 101 MMOL/L (ref 98–107)
CHOLESTEROL, FASTING: 155 MG/DL
CO2: 22 MMOL/L (ref 22–29)
CREAT SERPL-MCNC: 1 MG/DL (ref 0.5–1)
EOSINOPHILS ABSOLUTE: 0.06 K/UL (ref 0.05–0.5)
EOSINOPHILS RELATIVE PERCENT: 1 % (ref 0–6)
GFR SERPL CREATININE-BSD FRML MDRD: 52 ML/MIN/1.73M2
GLUCOSE BLD-MCNC: 112 MG/DL (ref 74–99)
HCT VFR BLD CALC: 41.8 % (ref 34–48)
HDLC SERPL-MCNC: 79 MG/DL
HEMOGLOBIN: 12.9 G/DL (ref 11.5–15.5)
IMMATURE GRANULOCYTES: 0 % (ref 0–5)
LDL CHOLESTEROL: 61 MG/DL
LYMPHOCYTES ABSOLUTE: 1.28 K/UL (ref 1.5–4)
LYMPHOCYTES RELATIVE PERCENT: 15 % (ref 20–42)
MCH RBC QN AUTO: 29.9 PG (ref 26–35)
MCHC RBC AUTO-ENTMCNC: 30.9 G/DL (ref 32–34.5)
MCV RBC AUTO: 96.8 FL (ref 80–99.9)
MONOCYTES ABSOLUTE: 0.55 K/UL (ref 0.1–0.95)
MONOCYTES RELATIVE PERCENT: 7 % (ref 2–12)
NEUTROPHILS ABSOLUTE: 6.56 K/UL (ref 1.8–7.3)
NEUTROPHILS RELATIVE PERCENT: 77 % (ref 43–80)
PDW BLD-RTO: 14.7 % (ref 11.5–15)
PLATELET # BLD: 310 K/UL (ref 130–450)
PMV BLD AUTO: 10.6 FL (ref 7–12)
POTASSIUM SERPL-SCNC: 4.7 MMOL/L (ref 3.5–5)
RBC # BLD: 4.32 M/UL (ref 3.5–5.5)
SODIUM BLD-SCNC: 138 MMOL/L (ref 132–146)
TOTAL PROTEIN: 7.4 G/DL (ref 6.4–8.3)
TRIGLYCERIDE, FASTING: 75 MG/DL
TSH SERPL DL<=0.05 MIU/L-ACNC: 0.39 UIU/ML (ref 0.27–4.2)
VLDLC SERPL CALC-MCNC: 15 MG/DL
WBC # BLD: 8.5 K/UL (ref 4.5–11.5)

## 2023-11-30 PROCEDURE — G0439 PPPS, SUBSEQ VISIT: HCPCS | Performed by: INTERNAL MEDICINE

## 2023-11-30 PROCEDURE — 1123F ACP DISCUSS/DSCN MKR DOCD: CPT | Performed by: INTERNAL MEDICINE

## 2023-11-30 PROCEDURE — 99214 OFFICE O/P EST MOD 30 MIN: CPT | Performed by: INTERNAL MEDICINE

## 2023-11-30 RX ORDER — TERBINAFINE HYDROCHLORIDE 250 MG/1
TABLET ORAL
Qty: 90 TABLET | Refills: 0 | Status: SHIPPED | OUTPATIENT
Start: 2023-11-30

## 2023-11-30 RX ORDER — GABAPENTIN 300 MG/1
300 CAPSULE ORAL DAILY
Qty: 90 CAPSULE | Refills: 0 | Status: SHIPPED | OUTPATIENT
Start: 2023-11-30 | End: 2024-02-28

## 2023-11-30 RX ORDER — CITALOPRAM 20 MG/1
20 TABLET ORAL DAILY
Qty: 90 TABLET | Refills: 0 | Status: SHIPPED | OUTPATIENT
Start: 2023-11-30

## 2023-11-30 RX ORDER — METOPROLOL SUCCINATE 25 MG/1
12.5 TABLET, EXTENDED RELEASE ORAL 3 TIMES DAILY PRN
Qty: 30 TABLET | Refills: 3 | Status: SHIPPED | OUTPATIENT
Start: 2023-11-30

## 2023-11-30 RX ORDER — KETOCONAZOLE 20 MG/G
CREAM TOPICAL 2 TIMES DAILY
Qty: 60 G | Refills: 1 | Status: SHIPPED | OUTPATIENT
Start: 2023-11-30

## 2023-11-30 RX ORDER — LEVOTHYROXINE SODIUM 0.05 MG/1
50 TABLET ORAL DAILY
Qty: 90 TABLET | Refills: 2 | Status: SHIPPED | OUTPATIENT
Start: 2023-11-30

## 2023-11-30 RX ORDER — ATORVASTATIN CALCIUM 20 MG/1
20 TABLET, FILM COATED ORAL NIGHTLY
Qty: 90 TABLET | Refills: 0 | Status: SHIPPED | OUTPATIENT
Start: 2023-11-30

## 2023-11-30 RX ORDER — OXYBUTYNIN CHLORIDE 5 MG/1
5 TABLET, EXTENDED RELEASE ORAL DAILY
Qty: 90 TABLET | Refills: 0 | Status: SHIPPED | OUTPATIENT
Start: 2023-11-30 | End: 2024-02-28

## 2023-11-30 RX ORDER — GABAPENTIN 100 MG/1
100 CAPSULE ORAL 2 TIMES DAILY
Qty: 180 CAPSULE | Refills: 0 | Status: SHIPPED | OUTPATIENT
Start: 2023-11-30 | End: 2024-02-28

## 2023-11-30 ASSESSMENT — ENCOUNTER SYMPTOMS
DIARRHEA: 0
SHORTNESS OF BREATH: 0
NAUSEA: 0
EYE DISCHARGE: 0
EYE ITCHING: 0
ABDOMINAL PAIN: 0
PHOTOPHOBIA: 0
WHEEZING: 0
COUGH: 0
CONSTIPATION: 0
SORE THROAT: 0
BLOOD IN STOOL: 0
TROUBLE SWALLOWING: 0
ABDOMINAL DISTENTION: 0
BACK PAIN: 0
APNEA: 0
SINUS PAIN: 0
VOMITING: 0

## 2023-11-30 ASSESSMENT — PATIENT HEALTH QUESTIONNAIRE - PHQ9
SUM OF ALL RESPONSES TO PHQ QUESTIONS 1-9: 0
5. POOR APPETITE OR OVEREATING: 0
2. FEELING DOWN, DEPRESSED OR HOPELESS: 0
2. FEELING DOWN, DEPRESSED OR HOPELESS: 0
9. THOUGHTS THAT YOU WOULD BE BETTER OFF DEAD, OR OF HURTING YOURSELF: 0
SUM OF ALL RESPONSES TO PHQ QUESTIONS 1-9: 0
SUM OF ALL RESPONSES TO PHQ QUESTIONS 1-9: 0
SUM OF ALL RESPONSES TO PHQ9 QUESTIONS 1 & 2: 0
1. LITTLE INTEREST OR PLEASURE IN DOING THINGS: 0
SUM OF ALL RESPONSES TO PHQ QUESTIONS 1-9: 0
3. TROUBLE FALLING OR STAYING ASLEEP: 0
4. FEELING TIRED OR HAVING LITTLE ENERGY: 0
6. FEELING BAD ABOUT YOURSELF - OR THAT YOU ARE A FAILURE OR HAVE LET YOURSELF OR YOUR FAMILY DOWN: 0
10. IF YOU CHECKED OFF ANY PROBLEMS, HOW DIFFICULT HAVE THESE PROBLEMS MADE IT FOR YOU TO DO YOUR WORK, TAKE CARE OF THINGS AT HOME, OR GET ALONG WITH OTHER PEOPLE: 0
8. MOVING OR SPEAKING SO SLOWLY THAT OTHER PEOPLE COULD HAVE NOTICED. OR THE OPPOSITE, BEING SO FIGETY OR RESTLESS THAT YOU HAVE BEEN MOVING AROUND A LOT MORE THAN USUAL: 0
7. TROUBLE CONCENTRATING ON THINGS, SUCH AS READING THE NEWSPAPER OR WATCHING TELEVISION: 0
1. LITTLE INTEREST OR PLEASURE IN DOING THINGS: 0
SUM OF ALL RESPONSES TO PHQ9 QUESTIONS 1 & 2: 0

## 2023-11-30 ASSESSMENT — LIFESTYLE VARIABLES
HOW MANY STANDARD DRINKS CONTAINING ALCOHOL DO YOU HAVE ON A TYPICAL DAY: 1 OR 2
HOW OFTEN DO YOU HAVE A DRINK CONTAINING ALCOHOL: 4 OR MORE TIMES A WEEK

## 2023-12-01 LAB
HBA1C MFR BLD: 5.6 % (ref 4–5.6)
T4 FREE: 1.5 NG/DL (ref 0.9–1.7)

## 2023-12-04 NOTE — TELEPHONE ENCOUNTER
Received fax from pharmacy for ketoconazole external cream  Directions do not match.  Do you want her to use it once or twice daily  Please send new rx

## 2023-12-05 RX ORDER — KETOCONAZOLE 20 MG/G
CREAM TOPICAL 2 TIMES DAILY
Qty: 60 G | Refills: 1 | Status: SHIPPED | OUTPATIENT
Start: 2023-12-05

## 2024-02-06 DIAGNOSIS — L71.9 ROSACEA: Primary | ICD-10-CM

## 2024-02-06 RX ORDER — METRONIDAZOLE 10 MG/G
GEL TOPICAL
Qty: 55 G | Refills: 0 | Status: SHIPPED | OUTPATIENT
Start: 2024-02-06

## 2024-02-23 DIAGNOSIS — F51.01 PRIMARY INSOMNIA: ICD-10-CM

## 2024-02-23 RX ORDER — GABAPENTIN 300 MG/1
300 CAPSULE ORAL DAILY
Qty: 30 CAPSULE | Refills: 0 | Status: SHIPPED | OUTPATIENT
Start: 2024-02-23 | End: 2024-03-24

## 2024-02-23 RX ORDER — OXYBUTYNIN CHLORIDE 5 MG/1
5 TABLET, EXTENDED RELEASE ORAL DAILY
Qty: 30 TABLET | Refills: 0 | Status: SHIPPED | OUTPATIENT
Start: 2024-02-23 | End: 2024-03-24

## 2024-03-05 RX ORDER — FLUTICASONE PROPIONATE 50 MCG
2 SPRAY, SUSPENSION (ML) NASAL DAILY
Qty: 16 G | Refills: 0 | OUTPATIENT
Start: 2024-03-05

## 2024-03-06 DIAGNOSIS — I48.0 PAROXYSMAL ATRIAL FIBRILLATION (HCC): ICD-10-CM

## 2024-03-06 DIAGNOSIS — E78.2 MIXED HYPERLIPIDEMIA: ICD-10-CM

## 2024-03-06 DIAGNOSIS — E03.9 ACQUIRED HYPOTHYROIDISM: ICD-10-CM

## 2024-03-20 ENCOUNTER — TELEPHONE (OUTPATIENT)
Dept: PRIMARY CARE CLINIC | Age: 89
End: 2024-03-20

## 2024-03-20 NOTE — TELEPHONE ENCOUNTER
Care Transitions Initial Follow Up Call    Outreach made within 2 business days of discharge: Yes    Patient: Yasmine Abraham Patient : 1934   MRN: [id number]  Reason for Admission: There are no discharge diagnoses documented for the most recent discharge.  Discharge Date: 23       Spoke with: yasmine     Discharge department/facility: [facility] Patient Contact:  Was patient able to fill all prescriptions: Yes  Was patient instructed to bring all medications to the follow-up visit: Yes  Is patient taking all medications as directed in the discharge summary? Yes  Does patient understand their discharge instructions: Yes  Does patient have questions or concerns that need addressed prior to 7-14 day follow up office visit: no    Scheduled appointment with PCP within 7-14 days    Follow Up  Future Appointments   Date Time Provider Department Center   3/25/2024  4:00 PM Natalie Barcenas MD [facility]       Berna Warren MA

## 2024-03-25 ENCOUNTER — OFFICE VISIT (OUTPATIENT)
Dept: PRIMARY CARE CLINIC | Age: 89
End: 2024-03-25

## 2024-03-25 VITALS
TEMPERATURE: 98.5 F | HEIGHT: 63 IN | HEART RATE: 53 BPM | DIASTOLIC BLOOD PRESSURE: 70 MMHG | OXYGEN SATURATION: 95 % | SYSTOLIC BLOOD PRESSURE: 130 MMHG | WEIGHT: 172 LBS | BODY MASS INDEX: 30.48 KG/M2

## 2024-03-25 DIAGNOSIS — Z09 HOSPITAL DISCHARGE FOLLOW-UP: Primary | ICD-10-CM

## 2024-03-25 DIAGNOSIS — F51.01 PRIMARY INSOMNIA: ICD-10-CM

## 2024-03-25 DIAGNOSIS — D50.0 IRON DEFICIENCY ANEMIA DUE TO CHRONIC BLOOD LOSS: ICD-10-CM

## 2024-03-25 DIAGNOSIS — T14.8XXA HEMATOMA AND CONTUSION: ICD-10-CM

## 2024-03-25 RX ORDER — LEVOTHYROXINE SODIUM 0.05 MG/1
50 TABLET ORAL DAILY
Qty: 90 TABLET | Refills: 0 | Status: SHIPPED | OUTPATIENT
Start: 2024-03-25

## 2024-03-25 RX ORDER — CITALOPRAM 20 MG/1
20 TABLET ORAL DAILY
Qty: 90 TABLET | Refills: 0 | Status: SHIPPED | OUTPATIENT
Start: 2024-03-25

## 2024-03-25 RX ORDER — OXYBUTYNIN CHLORIDE 5 MG/1
5 TABLET, EXTENDED RELEASE ORAL DAILY
Qty: 90 TABLET | Refills: 0 | Status: SHIPPED | OUTPATIENT
Start: 2024-03-25 | End: 2024-06-23

## 2024-03-25 RX ORDER — GABAPENTIN 300 MG/1
300 CAPSULE ORAL
Qty: 90 CAPSULE | Refills: 0 | Status: SHIPPED | OUTPATIENT
Start: 2024-03-25 | End: 2024-06-23

## 2024-03-25 RX ORDER — ATORVASTATIN CALCIUM 20 MG/1
20 TABLET, FILM COATED ORAL NIGHTLY
Qty: 90 TABLET | Refills: 0 | Status: SHIPPED | OUTPATIENT
Start: 2024-03-25

## 2024-03-25 ASSESSMENT — PATIENT HEALTH QUESTIONNAIRE - PHQ9
SUM OF ALL RESPONSES TO PHQ QUESTIONS 1-9: 0
7. TROUBLE CONCENTRATING ON THINGS, SUCH AS READING THE NEWSPAPER OR WATCHING TELEVISION: NOT AT ALL
10. IF YOU CHECKED OFF ANY PROBLEMS, HOW DIFFICULT HAVE THESE PROBLEMS MADE IT FOR YOU TO DO YOUR WORK, TAKE CARE OF THINGS AT HOME, OR GET ALONG WITH OTHER PEOPLE: NOT DIFFICULT AT ALL
4. FEELING TIRED OR HAVING LITTLE ENERGY: NOT AT ALL
SUM OF ALL RESPONSES TO PHQ QUESTIONS 1-9: 0
6. FEELING BAD ABOUT YOURSELF - OR THAT YOU ARE A FAILURE OR HAVE LET YOURSELF OR YOUR FAMILY DOWN: NOT AT ALL
9. THOUGHTS THAT YOU WOULD BE BETTER OFF DEAD, OR OF HURTING YOURSELF: NOT AT ALL
2. FEELING DOWN, DEPRESSED OR HOPELESS: NOT AT ALL
3. TROUBLE FALLING OR STAYING ASLEEP: NOT AT ALL
1. LITTLE INTEREST OR PLEASURE IN DOING THINGS: NOT AT ALL
SUM OF ALL RESPONSES TO PHQ QUESTIONS 1-9: 0
5. POOR APPETITE OR OVEREATING: NOT AT ALL
SUM OF ALL RESPONSES TO PHQ9 QUESTIONS 1 & 2: 0
8. MOVING OR SPEAKING SO SLOWLY THAT OTHER PEOPLE COULD HAVE NOTICED. OR THE OPPOSITE, BEING SO FIGETY OR RESTLESS THAT YOU HAVE BEEN MOVING AROUND A LOT MORE THAN USUAL: NOT AT ALL
SUM OF ALL RESPONSES TO PHQ QUESTIONS 1-9: 0

## 2024-03-25 NOTE — PROGRESS NOTES
Post-Discharge Transitional Care Management Progress Note      Yasmine Abraham   YOB: 1934    Date of Office Visit:  3/25/2024  Date of Hospital Admission: 3/11/24  Date of Hospital Discharge: 3/16/24    Care management risk score Rising risk (score 2-5) and Complex Care (Scores >=6): No Risk Score On File     Non face to face  following discharge, date last encounter closed (first attempt may have been earlier): 03/20/2024 03/20/2024    Call initiated 2 business days of discharge: Yes    ASSESSMENT/PLAN:   Hospital discharge follow-up  -     GA DISCHARGE MEDS RECONCILED W/ CURRENT OUTPATIENT MED LIST  Hematoma and contusion  Iron deficiency anemia due to chronic blood loss  -     CBC with Auto Differential; Future  Primary insomnia  -     gabapentin (NEURONTIN) 300 MG capsule; Take 1 capsule by mouth nightly for 90 days., Disp-90 capsule, R-0Normal      Medical Decision Making: high complexity  No follow-ups on file.         Subjective:   HPI:  Follow up of Hospital problems/diagnosis(es):   1)HEMATOMA ON RT THIGH  2)ANEMIA HGB WAS 9  3)AFIB:OFF ELIQUIS: TO RESTART ON IT NEXT SATURDAY        Inpatient course: Discharge summary reviewed- see chart.    Interval history/Current status: STABLE    Patient Active Problem List   Diagnosis    Atrial fibrillation (HCC)    Coronary atherosclerosis of native coronary artery    Hypothyroidism    Mixed hyperlipidemia    Pulmonary nodule    Primary insomnia    SVT (supraventricular tachycardia)    Sprain and strain of right ankle       Medications listed as ordered at the time of discharge from hospital     Medication List            Accurate as of March 25, 2024  4:44 PM. If you have any questions, ask your nurse or doctor.                CONTINUE taking these medications      apixaban 2.5 MG Tabs tablet  Commonly known as: Eliquis  Take 1 tablet by mouth 2 times daily     atorvastatin 20 MG tablet  Commonly known as: Lipitor  Take 1 tablet by mouth

## 2024-04-05 DIAGNOSIS — F51.01 PRIMARY INSOMNIA: ICD-10-CM

## 2024-04-05 RX ORDER — OXYBUTYNIN CHLORIDE 5 MG/1
5 TABLET, EXTENDED RELEASE ORAL DAILY
Qty: 30 TABLET | Refills: 0 | OUTPATIENT
Start: 2024-04-05

## 2024-04-05 RX ORDER — GABAPENTIN 300 MG/1
CAPSULE ORAL
Qty: 30 CAPSULE | Refills: 0 | OUTPATIENT
Start: 2024-04-05

## 2024-04-23 NOTE — TELEPHONE ENCOUNTER
Local Anesthesia Pre Procedure Assessment    Informed Consent:    Consent Obtained: Yes    I did explain the risks of the procedure to the patient which include but are not limited to infection (including meningitis), paralysis, spinal headaches, respiratory failure, bleeding, hematomas, death, spinal cord infarction, nerve root damage, stroke, cardiovascular events, and increased blood sugars. Patient understands and would like to proceed.         Procedure Assessment:    Preop Diagnosis/Indications for Procedure: cervical spondylosis without myelopathy  Planned Procedure: bilateral C5/C6, C6/C7 intraarticular facet injections  Planned Anesthetic: Local    Medical History/Comorbid Conditions:    Significant Medical/Surgical History: Yes (comment)  Normal Mental Status: Yes    Examination Pertinent to Procedure Being Performed:    Evaluation of Operative Site: site of procedure - clean, dry, intact    Other Findings:    Reviewed Current Medications and Allergies: Yes    Pertinent Lab/Diagnostic Tests:    Pertinent Lab / Diagnostic Tests: None      David Bernardo MD  4/23/2024     twice

## 2024-04-29 DIAGNOSIS — D50.0 IRON DEFICIENCY ANEMIA DUE TO CHRONIC BLOOD LOSS: ICD-10-CM

## 2024-04-29 LAB
BASOPHILS ABSOLUTE: 0.03 K/UL (ref 0–0.2)
BASOPHILS RELATIVE PERCENT: 1 % (ref 0–2)
EOSINOPHILS ABSOLUTE: 0.11 K/UL (ref 0.05–0.5)
EOSINOPHILS RELATIVE PERCENT: 2 % (ref 0–6)
HCT VFR BLD CALC: 40.7 % (ref 34–48)
HEMOGLOBIN: 12.4 G/DL (ref 11.5–15.5)
IMMATURE GRANULOCYTES %: 0 % (ref 0–5)
IMMATURE GRANULOCYTES ABSOLUTE: <0.03 K/UL (ref 0–0.58)
LYMPHOCYTES ABSOLUTE: 1.26 K/UL (ref 1.5–4)
LYMPHOCYTES RELATIVE PERCENT: 20 % (ref 20–42)
MCH RBC QN AUTO: 30 PG (ref 26–35)
MCHC RBC AUTO-ENTMCNC: 30.5 G/DL (ref 32–34.5)
MCV RBC AUTO: 98.3 FL (ref 80–99.9)
MONOCYTES ABSOLUTE: 0.49 K/UL (ref 0.1–0.95)
MONOCYTES RELATIVE PERCENT: 8 % (ref 2–12)
NEUTROPHILS ABSOLUTE: 4.34 K/UL (ref 1.8–7.3)
NEUTROPHILS RELATIVE PERCENT: 69 % (ref 43–80)
PDW BLD-RTO: 15.8 % (ref 11.5–15)
PLATELET # BLD: 288 K/UL (ref 130–450)
PMV BLD AUTO: 9.8 FL (ref 7–12)
RBC # BLD: 4.14 M/UL (ref 3.5–5.5)
WBC # BLD: 6.3 K/UL (ref 4.5–11.5)

## 2024-04-30 ENCOUNTER — OFFICE VISIT (OUTPATIENT)
Dept: PRIMARY CARE CLINIC | Age: 89
End: 2024-04-30
Payer: MEDICARE

## 2024-04-30 VITALS
BODY MASS INDEX: 30.3 KG/M2 | HEIGHT: 63 IN | OXYGEN SATURATION: 97 % | TEMPERATURE: 98.2 F | SYSTOLIC BLOOD PRESSURE: 138 MMHG | HEART RATE: 67 BPM | DIASTOLIC BLOOD PRESSURE: 80 MMHG | WEIGHT: 171 LBS

## 2024-04-30 DIAGNOSIS — F51.01 PRIMARY INSOMNIA: ICD-10-CM

## 2024-04-30 DIAGNOSIS — E03.9 ACQUIRED HYPOTHYROIDISM: ICD-10-CM

## 2024-04-30 DIAGNOSIS — I25.10 ATHEROSCLEROSIS OF NATIVE CORONARY ARTERY OF NATIVE HEART WITHOUT ANGINA PECTORIS: ICD-10-CM

## 2024-04-30 DIAGNOSIS — E78.2 MIXED HYPERLIPIDEMIA: ICD-10-CM

## 2024-04-30 DIAGNOSIS — Z00.00 MEDICARE ANNUAL WELLNESS VISIT, SUBSEQUENT: Primary | ICD-10-CM

## 2024-04-30 DIAGNOSIS — R73.01 IMPAIRED FASTING GLUCOSE: ICD-10-CM

## 2024-04-30 DIAGNOSIS — I48.0 PAROXYSMAL ATRIAL FIBRILLATION (HCC): ICD-10-CM

## 2024-04-30 PROCEDURE — G0439 PPPS, SUBSEQ VISIT: HCPCS | Performed by: INTERNAL MEDICINE

## 2024-04-30 PROCEDURE — 99214 OFFICE O/P EST MOD 30 MIN: CPT | Performed by: INTERNAL MEDICINE

## 2024-04-30 PROCEDURE — 1123F ACP DISCUSS/DSCN MKR DOCD: CPT | Performed by: INTERNAL MEDICINE

## 2024-04-30 RX ORDER — LEVOTHYROXINE SODIUM 0.05 MG/1
50 TABLET ORAL DAILY
Qty: 90 TABLET | Refills: 0 | Status: SHIPPED | OUTPATIENT
Start: 2024-04-30

## 2024-04-30 RX ORDER — ATORVASTATIN CALCIUM 20 MG/1
20 TABLET, FILM COATED ORAL NIGHTLY
Qty: 90 TABLET | Refills: 0 | Status: SHIPPED | OUTPATIENT
Start: 2024-04-30

## 2024-04-30 RX ORDER — CITALOPRAM 20 MG/1
20 TABLET ORAL DAILY
Qty: 90 TABLET | Refills: 0 | Status: SHIPPED | OUTPATIENT
Start: 2024-04-30

## 2024-04-30 RX ORDER — OXYBUTYNIN CHLORIDE 5 MG/1
5 TABLET, EXTENDED RELEASE ORAL DAILY
Qty: 90 TABLET | Refills: 0 | Status: SHIPPED | OUTPATIENT
Start: 2024-04-30 | End: 2024-07-29

## 2024-04-30 RX ORDER — METOPROLOL SUCCINATE 25 MG/1
12.5 TABLET, EXTENDED RELEASE ORAL 3 TIMES DAILY
COMMUNITY
End: 2024-04-30

## 2024-04-30 RX ORDER — GABAPENTIN 300 MG/1
300 CAPSULE ORAL
Qty: 90 CAPSULE | Refills: 0 | Status: SHIPPED | OUTPATIENT
Start: 2024-04-30 | End: 2024-07-29

## 2024-04-30 SDOH — ECONOMIC STABILITY: FOOD INSECURITY: WITHIN THE PAST 12 MONTHS, THE FOOD YOU BOUGHT JUST DIDN'T LAST AND YOU DIDN'T HAVE MONEY TO GET MORE.: NEVER TRUE

## 2024-04-30 SDOH — ECONOMIC STABILITY: FOOD INSECURITY: WITHIN THE PAST 12 MONTHS, YOU WORRIED THAT YOUR FOOD WOULD RUN OUT BEFORE YOU GOT MONEY TO BUY MORE.: NEVER TRUE

## 2024-04-30 SDOH — ECONOMIC STABILITY: INCOME INSECURITY: HOW HARD IS IT FOR YOU TO PAY FOR THE VERY BASICS LIKE FOOD, HOUSING, MEDICAL CARE, AND HEATING?: NOT HARD AT ALL

## 2024-04-30 ASSESSMENT — LIFESTYLE VARIABLES
HAVE YOU OR SOMEONE ELSE BEEN INJURED AS A RESULT OF YOUR DRINKING: NO
HOW OFTEN DURING THE LAST YEAR HAVE YOU FAILED TO DO WHAT WAS NORMALLY EXPECTED FROM YOU BECAUSE OF DRINKING: NEVER
HAS A RELATIVE, FRIEND, DOCTOR, OR ANOTHER HEALTH PROFESSIONAL EXPRESSED CONCERN ABOUT YOUR DRINKING OR SUGGESTED YOU CUT DOWN: NO
HOW OFTEN DURING THE LAST YEAR HAVE YOU HAD A FEELING OF GUILT OR REMORSE AFTER DRINKING: NEVER
HOW MANY STANDARD DRINKS CONTAINING ALCOHOL DO YOU HAVE ON A TYPICAL DAY: 1 OR 2
HOW OFTEN DURING THE LAST YEAR HAVE YOU FOUND THAT YOU WERE NOT ABLE TO STOP DRINKING ONCE YOU HAD STARTED: NEVER
HOW OFTEN DO YOU HAVE A DRINK CONTAINING ALCOHOL: 4 OR MORE TIMES A WEEK
HOW OFTEN DURING THE LAST YEAR HAVE YOU BEEN UNABLE TO REMEMBER WHAT HAPPENED THE NIGHT BEFORE BECAUSE YOU HAD BEEN DRINKING: NEVER
HOW OFTEN DURING THE LAST YEAR HAVE YOU NEEDED AN ALCOHOLIC DRINK FIRST THING IN THE MORNING TO GET YOURSELF GOING AFTER A NIGHT OF HEAVY DRINKING: NEVER

## 2024-04-30 ASSESSMENT — ENCOUNTER SYMPTOMS
VOMITING: 0
WHEEZING: 0
EYE DISCHARGE: 0
NAUSEA: 0
TROUBLE SWALLOWING: 0
PHOTOPHOBIA: 0
EYE ITCHING: 0
COUGH: 0
SHORTNESS OF BREATH: 0
SINUS PAIN: 0
BLOOD IN STOOL: 0
APNEA: 0
SORE THROAT: 0
BACK PAIN: 0
DIARRHEA: 0
ABDOMINAL PAIN: 0
ABDOMINAL DISTENTION: 0
CONSTIPATION: 0

## 2024-04-30 ASSESSMENT — PATIENT HEALTH QUESTIONNAIRE - PHQ9
2. FEELING DOWN, DEPRESSED OR HOPELESS: SEVERAL DAYS
7. TROUBLE CONCENTRATING ON THINGS, SUCH AS READING THE NEWSPAPER OR WATCHING TELEVISION: NOT AT ALL
9. THOUGHTS THAT YOU WOULD BE BETTER OFF DEAD, OR OF HURTING YOURSELF: NOT AT ALL
SUM OF ALL RESPONSES TO PHQ QUESTIONS 1-9: 2
1. LITTLE INTEREST OR PLEASURE IN DOING THINGS: SEVERAL DAYS
6. FEELING BAD ABOUT YOURSELF - OR THAT YOU ARE A FAILURE OR HAVE LET YOURSELF OR YOUR FAMILY DOWN: NOT AT ALL
3. TROUBLE FALLING OR STAYING ASLEEP: NOT AT ALL
SUM OF ALL RESPONSES TO PHQ QUESTIONS 1-9: 2
8. MOVING OR SPEAKING SO SLOWLY THAT OTHER PEOPLE COULD HAVE NOTICED. OR THE OPPOSITE, BEING SO FIGETY OR RESTLESS THAT YOU HAVE BEEN MOVING AROUND A LOT MORE THAN USUAL: NOT AT ALL
SUM OF ALL RESPONSES TO PHQ QUESTIONS 1-9: 2
5. POOR APPETITE OR OVEREATING: NOT AT ALL
4. FEELING TIRED OR HAVING LITTLE ENERGY: NOT AT ALL
SUM OF ALL RESPONSES TO PHQ9 QUESTIONS 1 & 2: 2
10. IF YOU CHECKED OFF ANY PROBLEMS, HOW DIFFICULT HAVE THESE PROBLEMS MADE IT FOR YOU TO DO YOUR WORK, TAKE CARE OF THINGS AT HOME, OR GET ALONG WITH OTHER PEOPLE: NOT DIFFICULT AT ALL
SUM OF ALL RESPONSES TO PHQ QUESTIONS 1-9: 2

## 2024-04-30 NOTE — PROGRESS NOTES
application topically twice per week allow to sit for 5 minutes before rinsing  Patient not taking: Reported on 4/30/2024  Provider, Historical, MD       CareTeam (Including outside providers/suppliers regularly involved in providing care):   Patient Care Team:  Natalie Barcenas MD as PCP - General  Natalie Barcenas MD as PCP - Empaneled Provider     Reviewed and updated this visit:  Tobacco  Allergies  Meds  Problems  Med Hx  Surg Hx  Soc Hx  Fam Hx             
yrs+ (1 - 1-dose 60+ series) Never done        Physical Exam  Constitutional:       Appearance: Normal appearance. She is normal weight.   HENT:      Head: Normocephalic and atraumatic.      Right Ear: Tympanic membrane and ear canal normal.      Left Ear: Tympanic membrane and ear canal normal.      Nose: Nose normal.      Mouth/Throat:      Mouth: Mucous membranes are moist.      Pharynx: Oropharynx is clear.   Eyes:      Extraocular Movements: Extraocular movements intact.      Conjunctiva/sclera: Conjunctivae normal.      Pupils: Pupils are equal, round, and reactive to light.   Cardiovascular:      Rate and Rhythm: Normal rate and regular rhythm.      Pulses: Normal pulses.      Heart sounds: Normal heart sounds.   Pulmonary:      Effort: Pulmonary effort is normal.      Breath sounds: Normal breath sounds.   Abdominal:      General: Abdomen is flat. Bowel sounds are normal.      Palpations: Abdomen is soft.   Musculoskeletal:         General: Normal range of motion.      Right shoulder: Normal.      Left shoulder: Normal.      Right upper arm: Normal.      Left upper arm: Normal.      Right elbow: Normal.      Left elbow: Normal.      Right forearm: Normal.      Left forearm: Normal.      Right wrist: Normal.      Left wrist: Normal.      Right hand: Normal.      Left hand: Normal.      Cervical back: Normal, normal range of motion and neck supple.      Lumbar back: Normal.   Skin:     General: Skin is warm and dry.   Neurological:      General: No focal deficit present.      Mental Status: She is alert and oriented to person, place, and time. Mental status is at baseline.   Psychiatric:         Mood and Affect: Mood normal.         Behavior: Behavior normal.         Thought Content: Thought content normal.         Judgment: Judgment normal.            Assessment & Plan   ASSESSMENT/PLAN:  1. Medicare annual wellness visit, subsequent  2. Paroxysmal atrial fibrillation (HCC)  3. Acquired hypothyroidism  -

## 2024-06-07 ENCOUNTER — OFFICE VISIT (OUTPATIENT)
Dept: PRIMARY CARE CLINIC | Age: 89
End: 2024-06-07
Payer: MEDICARE

## 2024-06-07 VITALS
DIASTOLIC BLOOD PRESSURE: 78 MMHG | BODY MASS INDEX: 30.85 KG/M2 | SYSTOLIC BLOOD PRESSURE: 136 MMHG | HEIGHT: 63 IN | WEIGHT: 174.1 LBS | TEMPERATURE: 98.4 F | HEART RATE: 68 BPM | OXYGEN SATURATION: 94 %

## 2024-06-07 DIAGNOSIS — R05.2 SUBACUTE COUGH: Primary | ICD-10-CM

## 2024-06-07 PROCEDURE — 1123F ACP DISCUSS/DSCN MKR DOCD: CPT | Performed by: INTERNAL MEDICINE

## 2024-06-07 PROCEDURE — 99213 OFFICE O/P EST LOW 20 MIN: CPT | Performed by: INTERNAL MEDICINE

## 2024-06-07 RX ORDER — AZITHROMYCIN 250 MG/1
TABLET, FILM COATED ORAL
Qty: 6 TABLET | Refills: 0 | Status: SHIPPED | OUTPATIENT
Start: 2024-06-07 | End: 2024-06-17

## 2024-06-07 RX ORDER — METOPROLOL SUCCINATE 25 MG/1
25 TABLET, EXTENDED RELEASE ORAL DAILY
COMMUNITY
End: 2024-06-07 | Stop reason: SDUPTHER

## 2024-06-07 RX ORDER — METHYLPREDNISOLONE 4 MG/1
TABLET ORAL
Qty: 1 KIT | Refills: 0 | Status: SHIPPED | OUTPATIENT
Start: 2024-06-07 | End: 2024-06-13

## 2024-06-07 RX ORDER — METOPROLOL SUCCINATE 25 MG/1
25 TABLET, EXTENDED RELEASE ORAL DAILY
Qty: 90 TABLET | Refills: 0 | Status: SHIPPED | OUTPATIENT
Start: 2024-06-07

## 2024-06-07 ASSESSMENT — ENCOUNTER SYMPTOMS
CONSTIPATION: 0
APNEA: 0
VOMITING: 0
PHOTOPHOBIA: 0
WHEEZING: 0
SINUS PAIN: 0
EYE ITCHING: 0
BLOOD IN STOOL: 0
ABDOMINAL PAIN: 0
SORE THROAT: 0
TROUBLE SWALLOWING: 0
EYE DISCHARGE: 0
DIARRHEA: 0
COUGH: 0
ABDOMINAL DISTENTION: 0
BACK PAIN: 0
SHORTNESS OF BREATH: 0
NAUSEA: 0

## 2024-06-08 NOTE — PROGRESS NOTES
series) Never done        Physical Exam  Constitutional:       Appearance: Normal appearance. She is normal weight.   HENT:      Head: Normocephalic and atraumatic.      Right Ear: Tympanic membrane and ear canal normal.      Left Ear: Tympanic membrane and ear canal normal.      Nose: Nose normal.      Mouth/Throat:      Mouth: Mucous membranes are moist.      Pharynx: Oropharynx is clear.   Eyes:      Extraocular Movements: Extraocular movements intact.      Conjunctiva/sclera: Conjunctivae normal.      Pupils: Pupils are equal, round, and reactive to light.   Cardiovascular:      Rate and Rhythm: Normal rate and regular rhythm.      Pulses: Normal pulses.      Heart sounds: Normal heart sounds.   Pulmonary:      Effort: Pulmonary effort is normal.      Breath sounds: Normal breath sounds.   Abdominal:      General: Abdomen is flat. Bowel sounds are normal.      Palpations: Abdomen is soft.   Musculoskeletal:         General: Normal range of motion.      Right shoulder: Normal.      Left shoulder: Normal.      Right upper arm: Normal.      Left upper arm: Normal.      Right elbow: Normal.      Left elbow: Normal.      Right forearm: Normal.      Left forearm: Normal.      Right wrist: Normal.      Left wrist: Normal.      Right hand: Normal.      Left hand: Normal.      Cervical back: Normal, normal range of motion and neck supple.      Lumbar back: Normal.   Skin:     General: Skin is warm and dry.   Neurological:      General: No focal deficit present.      Mental Status: She is alert and oriented to person, place, and time. Mental status is at baseline.   Psychiatric:         Mood and Affect: Mood normal.         Behavior: Behavior normal.         Thought Content: Thought content normal.         Judgment: Judgment normal.            Assessment & Plan   ASSESSMENT/PLAN:  1. Subacute cough  -     XR CHEST STANDARD (2 VW); Future  -     azithromycin (ZITHROMAX) 250 MG tablet; 500mg on day 1 followed by 250mg on

## 2024-06-12 ENCOUNTER — TELEPHONE (OUTPATIENT)
Dept: PRIMARY CARE CLINIC | Age: 89
End: 2024-06-12

## 2024-06-12 NOTE — TELEPHONE ENCOUNTER
Pharmacy called and wanted to know if she should be on eliquis 5 bid due to her age or if she should be on the 2.5 mg please advise if 2.5 new script needs sent

## 2024-06-17 ENCOUNTER — TELEPHONE (OUTPATIENT)
Dept: PRIMARY CARE CLINIC | Age: 89
End: 2024-06-17

## 2024-06-17 RX ORDER — ALBUTEROL SULFATE 90 UG/1
2 AEROSOL, METERED RESPIRATORY (INHALATION) 4 TIMES DAILY PRN
Qty: 18 G | Refills: 0 | Status: SHIPPED | OUTPATIENT
Start: 2024-06-17

## 2024-06-17 NOTE — TELEPHONE ENCOUNTER
Pt called stating she has been taking zpak and medrol dosepak. Only symptom she's still having is wheezing. Asking what she can do for this  Please advise.

## 2024-07-02 DIAGNOSIS — F51.01 PRIMARY INSOMNIA: ICD-10-CM

## 2024-07-02 RX ORDER — GABAPENTIN 300 MG/1
300 CAPSULE ORAL
Qty: 90 CAPSULE | Refills: 0 | Status: SHIPPED | OUTPATIENT
Start: 2024-07-02 | End: 2024-09-30

## 2024-07-02 RX ORDER — OXYBUTYNIN CHLORIDE 5 MG/1
5 TABLET, EXTENDED RELEASE ORAL DAILY
Qty: 90 TABLET | Refills: 0 | Status: SHIPPED | OUTPATIENT
Start: 2024-07-02 | End: 2024-09-30

## 2024-07-11 NOTE — PROGRESS NOTES
person, place, and time. Mental status is at baseline.   Psychiatric:         Mood and Affect: Mood normal.         Behavior: Behavior normal.         Thought Content: Thought content normal.         Judgment: Judgment normal.            Assessment & Plan   ASSESSMENT/PLAN:  1. Paroxysmal atrial fibrillation (HCC)  2. Atherosclerosis of native coronary artery of native heart without angina pectoris  3. Acquired hypothyroidism  -     T4, Free; Future  -     TSH; Future  4. Lung nodule  -     CT CHEST W WO CONTRAST; Future  5. Thyroid nodule  -     US THYROID; Future  6. Mixed hyperlipidemia  -     Comprehensive Metabolic Panel; Future  -     Lipid, Fasting; Future  7. Type 2 diabetes mellitus with hyperglycemia, without long-term current use of insulin (HCC)  -     Hemoglobin A1C; Future      No follow-ups on file.       An electronic signature was used to authenticate this note.    --Natalie Barcenas MD

## 2024-07-23 DIAGNOSIS — R73.01 IMPAIRED FASTING GLUCOSE: ICD-10-CM

## 2024-07-23 DIAGNOSIS — I25.10 ATHEROSCLEROSIS OF NATIVE CORONARY ARTERY OF NATIVE HEART WITHOUT ANGINA PECTORIS: ICD-10-CM

## 2024-07-23 DIAGNOSIS — E78.2 MIXED HYPERLIPIDEMIA: ICD-10-CM

## 2024-07-23 DIAGNOSIS — E03.9 ACQUIRED HYPOTHYROIDISM: ICD-10-CM

## 2024-07-23 LAB
ALBUMIN: 3.7 G/DL (ref 3.5–5.2)
ALP BLD-CCNC: 115 U/L (ref 35–104)
ALT SERPL-CCNC: 12 U/L (ref 0–32)
ANION GAP SERPL CALCULATED.3IONS-SCNC: 9 MMOL/L (ref 7–16)
AST SERPL-CCNC: 27 U/L (ref 0–31)
BASOPHILS ABSOLUTE: 0.03 K/UL (ref 0–0.2)
BASOPHILS RELATIVE PERCENT: 1 % (ref 0–2)
BILIRUB SERPL-MCNC: 0.6 MG/DL (ref 0–1.2)
BUN BLDV-MCNC: 14 MG/DL (ref 6–23)
CALCIUM SERPL-MCNC: 9.4 MG/DL (ref 8.6–10.2)
CHLORIDE BLD-SCNC: 103 MMOL/L (ref 98–107)
CHOLESTEROL, FASTING: 175 MG/DL
CO2: 26 MMOL/L (ref 22–29)
CREAT SERPL-MCNC: 1 MG/DL (ref 0.5–1)
EOSINOPHILS ABSOLUTE: 0.1 K/UL (ref 0.05–0.5)
EOSINOPHILS RELATIVE PERCENT: 2 % (ref 0–6)
GFR, ESTIMATED: 57 ML/MIN/1.73M2
GLUCOSE BLD-MCNC: 95 MG/DL (ref 74–99)
HBA1C MFR BLD: 6.1 % (ref 4–5.6)
HCT VFR BLD CALC: 42 % (ref 34–48)
HDLC SERPL-MCNC: 77 MG/DL
HEMOGLOBIN: 12.9 G/DL (ref 11.5–15.5)
IMMATURE GRANULOCYTES %: 1 % (ref 0–5)
IMMATURE GRANULOCYTES ABSOLUTE: 0.03 K/UL (ref 0–0.58)
LDL CHOLESTEROL: 83 MG/DL
LYMPHOCYTES ABSOLUTE: 1.03 K/UL (ref 1.5–4)
LYMPHOCYTES RELATIVE PERCENT: 17 % (ref 20–42)
MCH RBC QN AUTO: 29.3 PG (ref 26–35)
MCHC RBC AUTO-ENTMCNC: 30.7 G/DL (ref 32–34.5)
MCV RBC AUTO: 95.5 FL (ref 80–99.9)
MONOCYTES ABSOLUTE: 0.47 K/UL (ref 0.1–0.95)
MONOCYTES RELATIVE PERCENT: 8 % (ref 2–12)
NEUTROPHILS ABSOLUTE: 4.52 K/UL (ref 1.8–7.3)
NEUTROPHILS RELATIVE PERCENT: 73 % (ref 43–80)
PDW BLD-RTO: 15.1 % (ref 11.5–15)
PLATELET # BLD: 341 K/UL (ref 130–450)
PMV BLD AUTO: 10 FL (ref 7–12)
POTASSIUM SERPL-SCNC: 4.7 MMOL/L (ref 3.5–5)
RBC # BLD: 4.4 M/UL (ref 3.5–5.5)
SODIUM BLD-SCNC: 138 MMOL/L (ref 132–146)
T4 FREE: 1.5 NG/DL (ref 0.9–1.7)
TOTAL PROTEIN: 6.9 G/DL (ref 6.4–8.3)
TRIGLYCERIDE, FASTING: 77 MG/DL
TSH SERPL DL<=0.05 MIU/L-ACNC: 0.84 UIU/ML (ref 0.27–4.2)
VLDLC SERPL CALC-MCNC: 15 MG/DL
WBC # BLD: 6.2 K/UL (ref 4.5–11.5)

## 2024-07-31 ENCOUNTER — OFFICE VISIT (OUTPATIENT)
Dept: PRIMARY CARE CLINIC | Age: 89
End: 2024-07-31
Payer: MEDICARE

## 2024-07-31 VITALS
HEART RATE: 101 BPM | WEIGHT: 178 LBS | SYSTOLIC BLOOD PRESSURE: 120 MMHG | DIASTOLIC BLOOD PRESSURE: 70 MMHG | BODY MASS INDEX: 31.53 KG/M2 | OXYGEN SATURATION: 95 % | TEMPERATURE: 99.1 F

## 2024-07-31 DIAGNOSIS — E04.1 THYROID NODULE: ICD-10-CM

## 2024-07-31 DIAGNOSIS — R91.1 LUNG NODULE: ICD-10-CM

## 2024-07-31 DIAGNOSIS — I25.10 ATHEROSCLEROSIS OF NATIVE CORONARY ARTERY OF NATIVE HEART WITHOUT ANGINA PECTORIS: ICD-10-CM

## 2024-07-31 DIAGNOSIS — E78.2 MIXED HYPERLIPIDEMIA: ICD-10-CM

## 2024-07-31 DIAGNOSIS — E11.65 TYPE 2 DIABETES MELLITUS WITH HYPERGLYCEMIA, WITHOUT LONG-TERM CURRENT USE OF INSULIN (HCC): ICD-10-CM

## 2024-07-31 DIAGNOSIS — I48.0 PAROXYSMAL ATRIAL FIBRILLATION (HCC): Primary | ICD-10-CM

## 2024-07-31 DIAGNOSIS — E03.9 ACQUIRED HYPOTHYROIDISM: ICD-10-CM

## 2024-07-31 PROCEDURE — 3044F HG A1C LEVEL LT 7.0%: CPT | Performed by: INTERNAL MEDICINE

## 2024-07-31 PROCEDURE — 1123F ACP DISCUSS/DSCN MKR DOCD: CPT | Performed by: INTERNAL MEDICINE

## 2024-07-31 PROCEDURE — 99214 OFFICE O/P EST MOD 30 MIN: CPT | Performed by: INTERNAL MEDICINE

## 2024-07-31 RX ORDER — AZELASTINE 1 MG/ML
1-2 SPRAY, METERED NASAL 2 TIMES DAILY PRN
Qty: 30 ML | Refills: 1 | Status: SHIPPED | OUTPATIENT
Start: 2024-07-31

## 2024-07-31 RX ORDER — FLUTICASONE PROPIONATE 50 MCG
2 SPRAY, SUSPENSION (ML) NASAL DAILY
Qty: 16 G | Refills: 1 | Status: SHIPPED
Start: 2024-07-31 | End: 2024-07-31 | Stop reason: SDUPTHER

## 2024-07-31 RX ORDER — FLUTICASONE PROPIONATE 50 MCG
2 SPRAY, SUSPENSION (ML) NASAL DAILY
Qty: 16 G | Refills: 1 | Status: SHIPPED | OUTPATIENT
Start: 2024-07-31

## 2024-07-31 RX ORDER — ATORVASTATIN CALCIUM 20 MG/1
20 TABLET, FILM COATED ORAL NIGHTLY
Qty: 90 TABLET | Refills: 0 | Status: SHIPPED | OUTPATIENT
Start: 2024-07-31

## 2024-07-31 RX ORDER — AZELASTINE 1 MG/ML
1-2 SPRAY, METERED NASAL 2 TIMES DAILY PRN
Qty: 30 ML | Refills: 1 | Status: SHIPPED
Start: 2024-07-31 | End: 2024-07-31 | Stop reason: SDUPTHER

## 2024-08-27 ENCOUNTER — OFFICE VISIT (OUTPATIENT)
Dept: PRIMARY CARE CLINIC | Age: 89
End: 2024-08-27
Payer: MEDICARE

## 2024-08-27 VITALS
SYSTOLIC BLOOD PRESSURE: 128 MMHG | HEART RATE: 103 BPM | WEIGHT: 179.2 LBS | TEMPERATURE: 98 F | DIASTOLIC BLOOD PRESSURE: 84 MMHG | OXYGEN SATURATION: 97 % | HEIGHT: 63 IN | BODY MASS INDEX: 31.75 KG/M2

## 2024-08-27 DIAGNOSIS — M54.6 ACUTE MIDLINE THORACIC BACK PAIN: Primary | ICD-10-CM

## 2024-08-27 PROCEDURE — 99213 OFFICE O/P EST LOW 20 MIN: CPT | Performed by: INTERNAL MEDICINE

## 2024-08-27 PROCEDURE — 1123F ACP DISCUSS/DSCN MKR DOCD: CPT | Performed by: INTERNAL MEDICINE

## 2024-08-27 ASSESSMENT — ENCOUNTER SYMPTOMS
APNEA: 0
TROUBLE SWALLOWING: 0
ABDOMINAL PAIN: 0
EYE ITCHING: 0
BACK PAIN: 1
ABDOMINAL DISTENTION: 0
PHOTOPHOBIA: 0
SHORTNESS OF BREATH: 0
CONSTIPATION: 0
NAUSEA: 0
SORE THROAT: 0
BLOOD IN STOOL: 0
DIARRHEA: 0
COUGH: 0
WHEEZING: 0
VOMITING: 0
SINUS PAIN: 0
EYE DISCHARGE: 0

## 2024-08-27 NOTE — PROGRESS NOTES
Yasmine Abraham (:  1934) is a 89 y.o. female,Established patient, here for evaluation of the following chief complaint(s):  Back Pain (Thoracic pain right sided using lidocaine patches)      Subjective   SUBJECTIVE/OBJECTIVE:  HPI:  Thoracic pain: pain in mid back 10 days ago when she lifted heavy object     Review of Systems   Constitutional:  Negative for activity change, appetite change, fever and unexpected weight change.   HENT:  Negative for congestion, ear pain, hearing loss, sinus pain, sore throat, tinnitus and trouble swallowing.    Eyes:  Negative for photophobia, discharge, itching and visual disturbance.   Respiratory:  Negative for apnea, cough, shortness of breath and wheezing.    Cardiovascular:  Negative for chest pain, palpitations and leg swelling.   Gastrointestinal:  Negative for abdominal distention, abdominal pain, blood in stool, constipation, diarrhea, nausea and vomiting.   Endocrine: Negative for cold intolerance, polydipsia and polyuria.   Genitourinary:  Negative for difficulty urinating, dysuria, frequency and pelvic pain.   Musculoskeletal:  Positive for back pain. Negative for arthralgias, joint swelling, myalgias, neck pain and neck stiffness.   Skin:  Negative for rash and wound.   Neurological:  Negative for dizziness, tremors, syncope, light-headedness and headaches.              Objective   /84   Pulse (!) 103   Temp 98 °F (36.7 °C) (Temporal)   Ht 1.6 m (5' 3\")   Wt 81.3 kg (179 lb 3.2 oz)   SpO2 97%   BMI 31.74 kg/m²   Current Outpatient Medications   Medication Sig Dispense Refill    tiZANidine (ZANAFLEX) 4 MG tablet Take 1 tablet by mouth nightly as needed (back pain) 30 tablet 0    atorvastatin (LIPITOR) 20 MG tablet Take 1 tablet by mouth nightly 90 tablet 0    azelastine (ASTELIN) 0.1 % nasal spray 1-2 sprays by Nasal route 2 times daily as needed for Rhinitis Use in each nostril as directed 30 mL 1    fluticasone (FLONASE) 50 MCG/ACT nasal spray             Assessment & Plan   ASSESSMENT/PLAN:  1. Acute midline thoracic back pain  -     XR THORACIC SPINE (2 VIEWS); Future  -     XR LUMBAR SPINE (2-3 VIEWS); Future  -     External Referral To Physical Therapy  -     tiZANidine (ZANAFLEX) 4 MG tablet; Take 1 tablet by mouth nightly as needed (back pain), Disp-30 tablet, R-0Normal      No follow-ups on file.       An electronic signature was used to authenticate this note.    --Natalie Barcenas MD

## 2024-09-16 ENCOUNTER — HOSPITAL ENCOUNTER (OUTPATIENT)
Dept: ULTRASOUND IMAGING | Age: 89
Discharge: HOME | End: 2024-09-16
Attending: INTERNAL MEDICINE
Payer: MEDICARE

## 2024-09-16 ENCOUNTER — HOSPITAL ENCOUNTER (OUTPATIENT)
Dept: CT IMAGING | Age: 89
Discharge: HOME | End: 2024-09-16
Attending: INTERNAL MEDICINE
Payer: MEDICARE

## 2024-09-16 DIAGNOSIS — E04.1 THYROID NODULE: ICD-10-CM

## 2024-09-16 DIAGNOSIS — R91.1 LUNG NODULE: ICD-10-CM

## 2024-09-16 LAB
ALBUMIN SERPL-MCNC: 3.6 G/DL (ref 3.5–5.2)
ALP SERPL-CCNC: 136 U/L (ref 35–104)
ALT SERPL-CCNC: 12 U/L (ref 0–32)
ANION GAP SERPL CALCULATED.3IONS-SCNC: 9 MMOL/L (ref 7–16)
AST SERPL-CCNC: 24 U/L (ref 0–31)
BILIRUB SERPL-MCNC: 0.4 MG/DL (ref 0–1.2)
BUN SERPL-MCNC: 16 MG/DL (ref 6–23)
CALCIUM SERPL-MCNC: 9.4 MG/DL (ref 8.6–10.2)
CHLORIDE SERPL-SCNC: 101 MMOL/L (ref 98–107)
CHOLEST SERPL-MCNC: 120 MG/DL
CO2 SERPL-SCNC: 28 MMOL/L (ref 22–29)
CREAT SERPL-MCNC: 1.3 MG/DL (ref 0.5–1)
GFR, ESTIMATED: 40 ML/MIN/1.73M2
GLUCOSE SERPL-MCNC: 102 MG/DL (ref 74–99)
HBA1C MFR BLD: 5.8 % (ref 4–5.6)
HDLC SERPL-MCNC: 75 MG/DL
LDLC SERPL CALC-MCNC: 30 MG/DL
POTASSIUM SERPL-SCNC: 4.4 MMOL/L (ref 3.5–5)
PROT SERPL-MCNC: 6.8 G/DL (ref 6.4–8.3)
SODIUM SERPL-SCNC: 138 MMOL/L (ref 132–146)
T4 FREE SERPL-MCNC: 1.1 NG/DL (ref 0.9–1.7)
TRIGL SERPL-MCNC: 73 MG/DL
TSH SERPL DL<=0.05 MIU/L-ACNC: 2.24 UIU/ML (ref 0.27–4.2)
VLDLC SERPL CALC-MCNC: 15 MG/DL

## 2024-09-16 PROCEDURE — 76536 US EXAM OF HEAD AND NECK: CPT

## 2024-09-16 PROCEDURE — 84439 ASSAY OF FREE THYROXINE: CPT

## 2024-09-16 PROCEDURE — 80061 LIPID PANEL: CPT

## 2024-09-16 PROCEDURE — 6360000004 HC RX CONTRAST MEDICATION: Performed by: RADIOLOGY

## 2024-09-16 PROCEDURE — 84443 ASSAY THYROID STIM HORMONE: CPT

## 2024-09-16 PROCEDURE — 80053 COMPREHEN METABOLIC PANEL: CPT

## 2024-09-16 PROCEDURE — 36415 COLL VENOUS BLD VENIPUNCTURE: CPT

## 2024-09-16 PROCEDURE — 83036 HEMOGLOBIN GLYCOSYLATED A1C: CPT

## 2024-09-16 PROCEDURE — 71260 CT THORAX DX C+: CPT

## 2024-09-16 RX ORDER — IOPAMIDOL 755 MG/ML
75 INJECTION, SOLUTION INTRAVASCULAR
Status: COMPLETED | OUTPATIENT
Start: 2024-09-16 | End: 2024-09-16

## 2024-09-16 RX ADMIN — IOPAMIDOL 75 ML: 755 INJECTION, SOLUTION INTRAVENOUS at 09:15

## 2024-09-21 RX ORDER — ALBUTEROL SULFATE 90 UG/1
2 INHALANT RESPIRATORY (INHALATION) 4 TIMES DAILY PRN
Qty: 18 G | Refills: 0 | Status: SHIPPED | OUTPATIENT
Start: 2024-09-21

## 2024-09-25 ENCOUNTER — OFFICE VISIT (OUTPATIENT)
Dept: PRIMARY CARE CLINIC | Age: 89
End: 2024-09-25
Payer: MEDICARE

## 2024-09-25 VITALS
HEART RATE: 92 BPM | DIASTOLIC BLOOD PRESSURE: 78 MMHG | SYSTOLIC BLOOD PRESSURE: 120 MMHG | OXYGEN SATURATION: 98 % | HEIGHT: 63 IN | TEMPERATURE: 98 F | WEIGHT: 177.4 LBS | BODY MASS INDEX: 31.43 KG/M2

## 2024-09-25 DIAGNOSIS — I48.0 PAROXYSMAL ATRIAL FIBRILLATION (HCC): ICD-10-CM

## 2024-09-25 DIAGNOSIS — E04.1 THYROID NODULE GREATER THAN OR EQUAL TO 1.5 CM IN DIAMETER INCIDENTALLY NOTED ON IMAGING STUDY: Primary | ICD-10-CM

## 2024-09-25 DIAGNOSIS — E78.2 MIXED HYPERLIPIDEMIA: ICD-10-CM

## 2024-09-25 DIAGNOSIS — R73.01 IMPAIRED FASTING GLUCOSE: ICD-10-CM

## 2024-09-25 DIAGNOSIS — N18.31 STAGE 3A CHRONIC KIDNEY DISEASE (HCC): ICD-10-CM

## 2024-09-25 DIAGNOSIS — F32.9 REACTIVE DEPRESSION: ICD-10-CM

## 2024-09-25 PROCEDURE — 99214 OFFICE O/P EST MOD 30 MIN: CPT | Performed by: INTERNAL MEDICINE

## 2024-09-25 PROCEDURE — 1123F ACP DISCUSS/DSCN MKR DOCD: CPT | Performed by: INTERNAL MEDICINE

## 2024-09-25 ASSESSMENT — ENCOUNTER SYMPTOMS
VOMITING: 0
SINUS PAIN: 0
COUGH: 0
CONSTIPATION: 0
PHOTOPHOBIA: 0
EYE ITCHING: 0
TROUBLE SWALLOWING: 0
BACK PAIN: 0
BLOOD IN STOOL: 0
SORE THROAT: 0
APNEA: 0
NAUSEA: 0
SHORTNESS OF BREATH: 0
ABDOMINAL PAIN: 0
EYE DISCHARGE: 0
ABDOMINAL DISTENTION: 0
DIARRHEA: 0
WHEEZING: 0

## 2024-09-26 DIAGNOSIS — E04.1 THYROID NODULE GREATER THAN OR EQUAL TO 1.5 CM IN DIAMETER INCIDENTALLY NOTED ON IMAGING STUDY: Primary | ICD-10-CM

## 2024-10-04 DIAGNOSIS — F51.01 PRIMARY INSOMNIA: ICD-10-CM

## 2024-10-04 RX ORDER — GABAPENTIN 100 MG/1
100 CAPSULE ORAL AS NEEDED
Qty: 90 CAPSULE | Refills: 0 | Status: SHIPPED | OUTPATIENT
Start: 2024-10-04 | End: 2025-01-02

## 2024-10-04 RX ORDER — OXYBUTYNIN CHLORIDE 5 MG/1
5 TABLET, EXTENDED RELEASE ORAL DAILY
Qty: 90 TABLET | Refills: 0 | Status: SHIPPED | OUTPATIENT
Start: 2024-10-04 | End: 2025-01-02

## 2024-10-04 RX ORDER — LEVOTHYROXINE SODIUM 50 UG/1
50 TABLET ORAL DAILY
Qty: 90 TABLET | Refills: 0 | Status: SHIPPED | OUTPATIENT
Start: 2024-10-04

## 2024-10-04 RX ORDER — GABAPENTIN 300 MG/1
300 CAPSULE ORAL
Qty: 90 CAPSULE | Refills: 0 | Status: SHIPPED | OUTPATIENT
Start: 2024-10-04 | End: 2025-01-02

## 2024-10-08 ENCOUNTER — HOSPITAL ENCOUNTER (OUTPATIENT)
Dept: ULTRASOUND IMAGING | Age: 89
Discharge: HOME | End: 2024-10-08
Payer: MEDICARE

## 2024-10-08 DIAGNOSIS — E04.1 THYROID NODULE GREATER THAN OR EQUAL TO 1.5 CM IN DIAMETER INCIDENTALLY NOTED ON IMAGING STUDY: ICD-10-CM

## 2024-10-08 PROCEDURE — 88173 CYTOPATH EVAL FNA REPORT: CPT

## 2024-10-08 PROCEDURE — 88305 TISSUE EXAM BY PATHOLOGIST: CPT

## 2024-10-08 PROCEDURE — 88172 CYTP DX EVAL FNA 1ST EA SITE: CPT

## 2024-10-08 PROCEDURE — 76942 ECHO GUIDE FOR BIOPSY: CPT

## 2024-10-10 LAB — NON-GYN CYTOLOGY REPORT: NORMAL

## 2024-11-06 RX ORDER — ATORVASTATIN CALCIUM 20 MG/1
20 TABLET, FILM COATED ORAL NIGHTLY
Qty: 90 TABLET | Refills: 0 | Status: SHIPPED | OUTPATIENT
Start: 2024-11-06

## 2024-11-06 RX ORDER — ALBUTEROL SULFATE 90 UG/1
2 INHALANT RESPIRATORY (INHALATION) 4 TIMES DAILY PRN
Qty: 18 G | Refills: 0 | Status: SHIPPED | OUTPATIENT
Start: 2024-11-06

## 2024-11-06 NOTE — TELEPHONE ENCOUNTER
Name of Medication(s) Requested:  Requested Prescriptions     Pending Prescriptions Disp Refills    atorvastatin (LIPITOR) 20 MG tablet 90 tablet 0     Sig: Take 1 tablet by mouth nightly    albuterol sulfate HFA (VENTOLIN HFA) 108 (90 Base) MCG/ACT inhaler 18 g 0     Sig: Inhale 2 puffs into the lungs 4 times daily as needed for Wheezing       Medication is on current medication list Yes    Dosage and directions were verified? Yes    Quantity verified: 90 day supply     Pharmacy Verified?  Yes    Last Appointment:  9/25/2024    Future appts:  No future appointments.     (If no appt send self scheduling link. .REFILLAPPT)  Scheduling request sent?     [] Yes  [x] No    Does patient need updated?  [] Yes  [x] No

## 2024-12-11 ASSESSMENT — ENCOUNTER SYMPTOMS
EYE ITCHING: 0
BACK PAIN: 0
EYE DISCHARGE: 0
ABDOMINAL DISTENTION: 0
DIARRHEA: 0
ABDOMINAL PAIN: 0
SINUS PAIN: 0
WHEEZING: 0
TROUBLE SWALLOWING: 0
NAUSEA: 0
CONSTIPATION: 0
SORE THROAT: 0
VOMITING: 0
COUGH: 0
APNEA: 0
PHOTOPHOBIA: 0
BLOOD IN STOOL: 0
SHORTNESS OF BREATH: 0

## 2024-12-11 NOTE — PROGRESS NOTES
Other (See Comments)     Aggrevates arrhythmia    Erythromycin Other (See Comments)     Other reaction(s): redness, itching  Nausea      Macrodantin [Nitrofurantoin] Other (See Comments)     Causes high fever     Codeine Nausea And Vomiting        Past Medical History:   Diagnosis Date    Atrial fibrillation (HCC)     Compression fracture of L2 (HCC)     Depression     H/O cardiovascular stress test 12/06/2021    Lexiscan    Hyperlipidemia     Hypertension     Hypothyroidism     Thyroid disease      Past Surgical History:   Procedure Laterality Date    APPENDECTOMY      BACK SURGERY      BREAST REDUCTION SURGERY      BREAST SURGERY      CHOLECYSTECTOMY      HERNIA REPAIR      HYSTERECTOMY (CERVIX STATUS UNKNOWN)      HYSTERECTOMY, TOTAL ABDOMINAL (CERVIX REMOVED)      TONSILLECTOMY AND ADENOIDECTOMY      US THYROID BIOPSY  10/8/2024    US THYROID BIOPSY 10/8/2024 [facility] ULTRASOUND     Family History   Problem Relation Age of Onset    Melanoma Mother     Colon Cancer Father       Social History     Socioeconomic History    Marital status:      Spouse name: Not on file    Number of children: Not on file    Years of education: Not on file    Highest education level: Not on file   Occupational History    Not on file   Tobacco Use    Smoking status: Never    Smokeless tobacco: Never   Substance and Sexual Activity    Alcohol use: Yes     Comment: social    Drug use: Never    Sexual activity: Not Currently     Partners: Male   Other Topics Concern    Not on file   Social History Narrative    Not on file     Social Determinants of Health     Financial Resource Strain: Low Risk  (4/30/2024)    Overall Financial Resource Strain (CARDIA)     Difficulty of Paying Living Expenses: Not hard at all   Food Insecurity: No Food Insecurity (4/30/2024)    Hunger Vital Sign     Worried About Running Out of Food in the Last Year: Never true     Ran Out of Food in the Last Year: Never true   Transportation Needs: Unknown (4/30/2024)

## 2024-12-12 DIAGNOSIS — N18.31 STAGE 3A CHRONIC KIDNEY DISEASE (HCC): ICD-10-CM

## 2024-12-12 DIAGNOSIS — E78.2 MIXED HYPERLIPIDEMIA: ICD-10-CM

## 2024-12-12 DIAGNOSIS — R73.01 IMPAIRED FASTING GLUCOSE: ICD-10-CM

## 2024-12-12 DIAGNOSIS — E04.1 THYROID NODULE GREATER THAN OR EQUAL TO 1.5 CM IN DIAMETER INCIDENTALLY NOTED ON IMAGING STUDY: ICD-10-CM

## 2024-12-12 LAB
ALBUMIN: 3.6 G/DL (ref 3.5–5.2)
ALP BLD-CCNC: 125 U/L (ref 35–104)
ALT SERPL-CCNC: 14 U/L (ref 0–32)
ANION GAP SERPL CALCULATED.3IONS-SCNC: 14 MMOL/L (ref 7–16)
AST SERPL-CCNC: 26 U/L (ref 0–31)
BASOPHILS ABSOLUTE: 0.03 K/UL (ref 0–0.2)
BASOPHILS RELATIVE PERCENT: 1 % (ref 0–2)
BILIRUB SERPL-MCNC: 0.5 MG/DL (ref 0–1.2)
BUN BLDV-MCNC: 17 MG/DL (ref 6–23)
CALCIUM SERPL-MCNC: 9.4 MG/DL (ref 8.6–10.2)
CHLORIDE BLD-SCNC: 103 MMOL/L (ref 98–107)
CHOLESTEROL, FASTING: 127 MG/DL
CO2: 24 MMOL/L (ref 22–29)
CREAT SERPL-MCNC: 1 MG/DL (ref 0.5–1)
EOSINOPHILS ABSOLUTE: 0.18 K/UL (ref 0.05–0.5)
EOSINOPHILS RELATIVE PERCENT: 3 % (ref 0–6)
GFR, ESTIMATED: 56 ML/MIN/1.73M2
GLUCOSE BLD-MCNC: 103 MG/DL (ref 74–99)
HBA1C MFR BLD: 5.9 % (ref 4–5.6)
HCT VFR BLD CALC: 41.2 % (ref 34–48)
HDLC SERPL-MCNC: 73 MG/DL
HEMOGLOBIN: 12.5 G/DL (ref 11.5–15.5)
IMMATURE GRANULOCYTES %: 0 % (ref 0–5)
IMMATURE GRANULOCYTES ABSOLUTE: <0.03 K/UL (ref 0–0.58)
LDL CHOLESTEROL: 42 MG/DL
LYMPHOCYTES ABSOLUTE: 1.26 K/UL (ref 1.5–4)
LYMPHOCYTES RELATIVE PERCENT: 20 % (ref 20–42)
MCH RBC QN AUTO: 28.5 PG (ref 26–35)
MCHC RBC AUTO-ENTMCNC: 30.3 G/DL (ref 32–34.5)
MCV RBC AUTO: 94.1 FL (ref 80–99.9)
MONOCYTES ABSOLUTE: 0.52 K/UL (ref 0.1–0.95)
MONOCYTES RELATIVE PERCENT: 8 % (ref 2–12)
NEUTROPHILS ABSOLUTE: 4.18 K/UL (ref 1.8–7.3)
NEUTROPHILS RELATIVE PERCENT: 68 % (ref 43–80)
PDW BLD-RTO: 16.6 % (ref 11.5–15)
PLATELET # BLD: 274 K/UL (ref 130–450)
PMV BLD AUTO: 10.3 FL (ref 7–12)
POTASSIUM SERPL-SCNC: 4 MMOL/L (ref 3.5–5)
RBC # BLD: 4.38 M/UL (ref 3.5–5.5)
SODIUM BLD-SCNC: 141 MMOL/L (ref 132–146)
T4 FREE: 1.4 NG/DL (ref 0.9–1.7)
TOTAL PROTEIN: 6.7 G/DL (ref 6.4–8.3)
TRIGLYCERIDE, FASTING: 60 MG/DL
TSH SERPL DL<=0.05 MIU/L-ACNC: 0.54 UIU/ML (ref 0.27–4.2)
VLDLC SERPL CALC-MCNC: 12 MG/DL
WBC # BLD: 6.2 K/UL (ref 4.5–11.5)

## 2024-12-13 ENCOUNTER — OFFICE VISIT (OUTPATIENT)
Dept: PRIMARY CARE CLINIC | Age: 88
End: 2024-12-13

## 2024-12-13 VITALS
SYSTOLIC BLOOD PRESSURE: 126 MMHG | WEIGHT: 177.2 LBS | DIASTOLIC BLOOD PRESSURE: 78 MMHG | TEMPERATURE: 97.3 F | BODY MASS INDEX: 31.4 KG/M2 | OXYGEN SATURATION: 97 % | HEART RATE: 72 BPM | HEIGHT: 63 IN

## 2024-12-13 DIAGNOSIS — E03.9 ACQUIRED HYPOTHYROIDISM: ICD-10-CM

## 2024-12-13 DIAGNOSIS — E78.2 MIXED HYPERLIPIDEMIA: ICD-10-CM

## 2024-12-13 DIAGNOSIS — I48.0 PAROXYSMAL ATRIAL FIBRILLATION (HCC): Primary | ICD-10-CM

## 2024-12-13 DIAGNOSIS — R73.01 IMPAIRED FASTING GLUCOSE: ICD-10-CM

## 2024-12-13 DIAGNOSIS — F32.9 REACTIVE DEPRESSION: ICD-10-CM

## 2024-12-13 DIAGNOSIS — N18.31 STAGE 3A CHRONIC KIDNEY DISEASE (HCC): ICD-10-CM

## 2024-12-13 DIAGNOSIS — R26.81 UNSTEADY GAIT WHEN WALKING: ICD-10-CM

## 2024-12-13 RX ORDER — AZELASTINE 1 MG/ML
1-2 SPRAY, METERED NASAL 2 TIMES DAILY PRN
Qty: 30 ML | Refills: 1 | Status: SHIPPED | OUTPATIENT
Start: 2024-12-13

## 2024-12-13 RX ORDER — FLUTICASONE PROPIONATE 50 MCG
2 SPRAY, SUSPENSION (ML) NASAL DAILY
Qty: 16 G | Refills: 1 | Status: SHIPPED | OUTPATIENT
Start: 2024-12-13

## 2024-12-13 RX ORDER — ALBUTEROL SULFATE 90 UG/1
2 INHALANT RESPIRATORY (INHALATION) 4 TIMES DAILY PRN
Qty: 18 G | Refills: 0 | Status: SHIPPED | OUTPATIENT
Start: 2024-12-13

## 2025-01-06 DIAGNOSIS — F51.01 PRIMARY INSOMNIA: ICD-10-CM

## 2025-01-06 RX ORDER — LEVOTHYROXINE SODIUM 50 UG/1
50 TABLET ORAL DAILY
Qty: 90 TABLET | Refills: 0 | Status: SHIPPED | OUTPATIENT
Start: 2025-01-06

## 2025-01-06 RX ORDER — OXYBUTYNIN CHLORIDE 5 MG/1
5 TABLET, EXTENDED RELEASE ORAL DAILY
Qty: 90 TABLET | Refills: 0 | Status: SHIPPED | OUTPATIENT
Start: 2025-01-06 | End: 2025-04-06

## 2025-01-06 RX ORDER — GABAPENTIN 300 MG/1
300 CAPSULE ORAL
Qty: 90 CAPSULE | Refills: 0 | Status: SHIPPED | OUTPATIENT
Start: 2025-01-06 | End: 2025-04-06

## 2025-01-06 NOTE — TELEPHONE ENCOUNTER
Name of Medication(s) Requested:  Requested Prescriptions      No prescriptions requested or ordered in this encounter       Medication is on current medication list Yes    Dosage and directions were verified? Yes    Quantity verified: 90 day supply     Pharmacy Verified?  Yes    Last Appointment:  12/13/2024    Future appts:  Future Appointments   Date Time Provider Department Center   3/20/2025 11:30 AM Natalie Barcenas MD [facility] DEP        (If no appt send self scheduling link. .REFILLAPPT)  Scheduling request sent?     [] Yes  [x] No    Does patient need updated?  [] Yes  [x] No

## 2025-02-17 ENCOUNTER — OFFICE VISIT (OUTPATIENT)
Dept: PRIMARY CARE CLINIC | Age: 89
End: 2025-02-17

## 2025-02-17 VITALS
WEIGHT: 177 LBS | HEIGHT: 63 IN | OXYGEN SATURATION: 93 % | TEMPERATURE: 97.3 F | DIASTOLIC BLOOD PRESSURE: 70 MMHG | HEART RATE: 122 BPM | SYSTOLIC BLOOD PRESSURE: 120 MMHG | BODY MASS INDEX: 31.36 KG/M2

## 2025-02-17 DIAGNOSIS — J10.1 INFLUENZA A: ICD-10-CM

## 2025-02-17 DIAGNOSIS — E03.9 ACQUIRED HYPOTHYROIDISM: ICD-10-CM

## 2025-02-17 DIAGNOSIS — E78.2 MIXED HYPERLIPIDEMIA: ICD-10-CM

## 2025-02-17 DIAGNOSIS — E11.65 TYPE 2 DIABETES MELLITUS WITH HYPERGLYCEMIA, WITHOUT LONG-TERM CURRENT USE OF INSULIN (HCC): Primary | ICD-10-CM

## 2025-02-17 DIAGNOSIS — R05.1 ACUTE COUGH: ICD-10-CM

## 2025-02-17 DIAGNOSIS — N18.31 STAGE 3A CHRONIC KIDNEY DISEASE (HCC): ICD-10-CM

## 2025-02-17 DIAGNOSIS — I48.0 PAROXYSMAL ATRIAL FIBRILLATION (HCC): ICD-10-CM

## 2025-02-17 LAB
INFLUENZA A ANTIBODY: POSITIVE
INFLUENZA B ANTIBODY: NORMAL
Lab: NORMAL
PERFORMING INSTRUMENT: NORMAL
QC PASS/FAIL: NORMAL
SARS-COV-2, POC: NORMAL

## 2025-02-17 RX ORDER — OSELTAMIVIR PHOSPHATE 75 MG/1
75 CAPSULE ORAL 2 TIMES DAILY
Qty: 10 CAPSULE | Refills: 0 | Status: SHIPPED | OUTPATIENT
Start: 2025-02-17 | End: 2025-02-27

## 2025-02-17 RX ORDER — ALBUTEROL SULFATE 90 UG/1
2 INHALANT RESPIRATORY (INHALATION) 4 TIMES DAILY PRN
Qty: 18 G | Refills: 0 | Status: SHIPPED | OUTPATIENT
Start: 2025-02-17

## 2025-02-17 RX ORDER — METHYLPREDNISOLONE 4 MG/1
TABLET ORAL
Qty: 1 KIT | Refills: 0 | Status: SHIPPED | OUTPATIENT
Start: 2025-02-17 | End: 2025-02-23

## 2025-02-17 RX ORDER — ATORVASTATIN CALCIUM 20 MG/1
20 TABLET, FILM COATED ORAL NIGHTLY
Qty: 90 TABLET | Refills: 0 | Status: SHIPPED | OUTPATIENT
Start: 2025-02-17

## 2025-02-17 ASSESSMENT — ENCOUNTER SYMPTOMS
EYE ITCHING: 0
SORE THROAT: 0
NAUSEA: 0
BACK PAIN: 0
ABDOMINAL DISTENTION: 0
SINUS PAIN: 1
CONSTIPATION: 0
COUGH: 0
SHORTNESS OF BREATH: 1
APNEA: 0
VOMITING: 0
PHOTOPHOBIA: 0
WHEEZING: 1
TROUBLE SWALLOWING: 0
EYE DISCHARGE: 0
DIARRHEA: 0
BLOOD IN STOOL: 0
ABDOMINAL PAIN: 0

## 2025-02-17 NOTE — PROGRESS NOTES
Yasmine Abraham (:  1934) is a 89 y.o. female,Established patient, here for evaluation of the following chief complaint(s):  Cough and Congestion      Subjective   SUBJECTIVE/OBJECTIVE:  HPI:  Upper Respiratory Infection:   Patient complains of symptoms of a URI. Symptoms include sore throat ,congestion, post nasal drip , cough.  No shortness of breath no fever no ear pain,  started 2 days ago, got gradually worse, took OTC medications without help    Review of Systems   Constitutional:  Negative for activity change, appetite change, fever and unexpected weight change.   HENT:  Positive for congestion, postnasal drip and sinus pain. Negative for ear pain, hearing loss, sore throat, tinnitus and trouble swallowing.    Eyes:  Negative for photophobia, discharge, itching and visual disturbance.   Respiratory:  Positive for shortness of breath and wheezing. Negative for apnea and cough.    Cardiovascular:  Negative for chest pain, palpitations and leg swelling.   Gastrointestinal:  Negative for abdominal distention, abdominal pain, blood in stool, constipation, diarrhea, nausea and vomiting.   Endocrine: Negative for cold intolerance, polydipsia and polyuria.   Genitourinary:  Negative for difficulty urinating, dysuria, frequency and pelvic pain.   Musculoskeletal:  Negative for arthralgias, back pain, joint swelling, myalgias, neck pain and neck stiffness.   Skin:  Negative for rash and wound.   Neurological:  Negative for dizziness, tremors, syncope, light-headedness and headaches.     CBC with Differential:    Lab Results   Component Value Date/Time    WBC 6.2 2024 09:14 AM    RBC 4.38 2024 09:14 AM    HGB 12.5 2024 09:14 AM    HCT 41.2 2024 09:14 AM     2024 09:14 AM    MCV 94.1 2024 09:14 AM    MCH 28.5 2024 09:14 AM    MCHC 30.3 2024 09:14 AM    RDW 16.6 2024 09:14 AM    LYMPHOPCT 20 2024 09:14 AM    MONOPCT 8 2024 09:14 AM

## 2025-03-31 ENCOUNTER — TELEPHONE (OUTPATIENT)
Dept: PRIMARY CARE CLINIC | Age: 89
End: 2025-03-31

## 2025-03-31 NOTE — TELEPHONE ENCOUNTER
Pt called in requesting referral to Dr Weinstein for sciatica pain. Advised pt she was overdue for appt and scheduled.

## 2025-04-03 SDOH — HEALTH STABILITY: PHYSICAL HEALTH: ON AVERAGE, HOW MANY DAYS PER WEEK DO YOU ENGAGE IN MODERATE TO STRENUOUS EXERCISE (LIKE A BRISK WALK)?: 0 DAYS

## 2025-04-03 SDOH — HEALTH STABILITY: PHYSICAL HEALTH: ON AVERAGE, HOW MANY MINUTES DO YOU ENGAGE IN EXERCISE AT THIS LEVEL?: 0 MIN

## 2025-04-03 ASSESSMENT — PATIENT HEALTH QUESTIONNAIRE - PHQ9
10. IF YOU CHECKED OFF ANY PROBLEMS, HOW DIFFICULT HAVE THESE PROBLEMS MADE IT FOR YOU TO DO YOUR WORK, TAKE CARE OF THINGS AT HOME, OR GET ALONG WITH OTHER PEOPLE: NOT DIFFICULT AT ALL
9. THOUGHTS THAT YOU WOULD BE BETTER OFF DEAD, OR OF HURTING YOURSELF: NOT AT ALL
SUM OF ALL RESPONSES TO PHQ QUESTIONS 1-9: 0
2. FEELING DOWN, DEPRESSED OR HOPELESS: NOT AT ALL
4. FEELING TIRED OR HAVING LITTLE ENERGY: NOT AT ALL
SUM OF ALL RESPONSES TO PHQ QUESTIONS 1-9: 0
1. LITTLE INTEREST OR PLEASURE IN DOING THINGS: NOT AT ALL
SUM OF ALL RESPONSES TO PHQ QUESTIONS 1-9: 0
5. POOR APPETITE OR OVEREATING: NOT AT ALL
6. FEELING BAD ABOUT YOURSELF - OR THAT YOU ARE A FAILURE OR HAVE LET YOURSELF OR YOUR FAMILY DOWN: NOT AT ALL
8. MOVING OR SPEAKING SO SLOWLY THAT OTHER PEOPLE COULD HAVE NOTICED. OR THE OPPOSITE, BEING SO FIGETY OR RESTLESS THAT YOU HAVE BEEN MOVING AROUND A LOT MORE THAN USUAL: NOT AT ALL
SUM OF ALL RESPONSES TO PHQ QUESTIONS 1-9: 0
3. TROUBLE FALLING OR STAYING ASLEEP: NOT AT ALL
7. TROUBLE CONCENTRATING ON THINGS, SUCH AS READING THE NEWSPAPER OR WATCHING TELEVISION: NOT AT ALL

## 2025-04-03 ASSESSMENT — LIFESTYLE VARIABLES
HOW OFTEN DO YOU HAVE A DRINK CONTAINING ALCOHOL: 4
HOW MANY STANDARD DRINKS CONTAINING ALCOHOL DO YOU HAVE ON A TYPICAL DAY: 1
HOW OFTEN DO YOU HAVE SIX OR MORE DRINKS ON ONE OCCASION: 1
HOW MANY STANDARD DRINKS CONTAINING ALCOHOL DO YOU HAVE ON A TYPICAL DAY: 1 OR 2
HOW OFTEN DO YOU HAVE A DRINK CONTAINING ALCOHOL: 2-3 TIMES A WEEK

## 2025-04-04 DIAGNOSIS — N18.31 STAGE 3A CHRONIC KIDNEY DISEASE (HCC): ICD-10-CM

## 2025-04-04 DIAGNOSIS — E03.9 ACQUIRED HYPOTHYROIDISM: ICD-10-CM

## 2025-04-04 DIAGNOSIS — E11.65 TYPE 2 DIABETES MELLITUS WITH HYPERGLYCEMIA, WITHOUT LONG-TERM CURRENT USE OF INSULIN (HCC): ICD-10-CM

## 2025-04-04 DIAGNOSIS — E78.2 MIXED HYPERLIPIDEMIA: ICD-10-CM

## 2025-04-04 LAB
ALBUMIN: 3.7 G/DL (ref 3.5–5.2)
ALP BLD-CCNC: 112 U/L (ref 35–104)
ALT SERPL-CCNC: 11 U/L (ref 0–32)
ANION GAP SERPL CALCULATED.3IONS-SCNC: 13 MMOL/L (ref 7–16)
AST SERPL-CCNC: 27 U/L (ref 0–31)
BASOPHILS ABSOLUTE: 0.03 K/UL (ref 0–0.2)
BASOPHILS RELATIVE PERCENT: 1 % (ref 0–2)
BILIRUB SERPL-MCNC: 0.5 MG/DL (ref 0–1.2)
BUN BLDV-MCNC: 17 MG/DL (ref 6–23)
CALCIUM SERPL-MCNC: 9.6 MG/DL (ref 8.6–10.2)
CHLORIDE BLD-SCNC: 102 MMOL/L (ref 98–107)
CHOLESTEROL, FASTING: 124 MG/DL
CO2: 23 MMOL/L (ref 22–29)
CREAT SERPL-MCNC: 1 MG/DL (ref 0.5–1)
EOSINOPHILS ABSOLUTE: 0.18 K/UL (ref 0.05–0.5)
EOSINOPHILS RELATIVE PERCENT: 4 % (ref 0–6)
GFR, ESTIMATED: 53 ML/MIN/1.73M2
GLUCOSE BLD-MCNC: 102 MG/DL (ref 74–99)
HBA1C MFR BLD: 5.9 % (ref 4–5.6)
HCT VFR BLD CALC: 43.8 % (ref 34–48)
HDLC SERPL-MCNC: 66 MG/DL
HEMOGLOBIN: 13.4 G/DL (ref 11.5–15.5)
IMMATURE GRANULOCYTES %: 0 % (ref 0–5)
IMMATURE GRANULOCYTES ABSOLUTE: <0.03 K/UL (ref 0–0.58)
LDL CHOLESTEROL: 44 MG/DL
LYMPHOCYTES ABSOLUTE: 0.9 K/UL (ref 1.5–4)
LYMPHOCYTES RELATIVE PERCENT: 21 % (ref 20–42)
MCH RBC QN AUTO: 28.3 PG (ref 26–35)
MCHC RBC AUTO-ENTMCNC: 30.6 G/DL (ref 32–34.5)
MCV RBC AUTO: 92.4 FL (ref 80–99.9)
MONOCYTES ABSOLUTE: 0.47 K/UL (ref 0.1–0.95)
MONOCYTES RELATIVE PERCENT: 11 % (ref 2–12)
NEUTROPHILS ABSOLUTE: 2.73 K/UL (ref 1.8–7.3)
NEUTROPHILS RELATIVE PERCENT: 63 % (ref 43–80)
PDW BLD-RTO: 15.9 % (ref 11.5–15)
PLATELET # BLD: 319 K/UL (ref 130–450)
PMV BLD AUTO: 10.7 FL (ref 7–12)
POTASSIUM SERPL-SCNC: 4.5 MMOL/L (ref 3.5–5)
RBC # BLD: 4.74 M/UL (ref 3.5–5.5)
SODIUM BLD-SCNC: 138 MMOL/L (ref 132–146)
T4 FREE: 1.4 NG/DL (ref 0.9–1.7)
TOTAL PROTEIN: 7 G/DL (ref 6.4–8.3)
TRIGLYCERIDE, FASTING: 68 MG/DL
TSH SERPL DL<=0.05 MIU/L-ACNC: 1.49 UIU/ML (ref 0.27–4.2)
VLDLC SERPL CALC-MCNC: 14 MG/DL
WBC # BLD: 4.3 K/UL (ref 4.5–11.5)

## 2025-04-04 RX ORDER — AZELASTINE HYDROCHLORIDE 137 UG/1
SPRAY, METERED NASAL
Qty: 30 ML | Refills: 0 | Status: SHIPPED | OUTPATIENT
Start: 2025-04-04

## 2025-04-04 NOTE — TELEPHONE ENCOUNTER
Name of Medication(s) Requested:  Requested Prescriptions     Pending Prescriptions Disp Refills    azelastine (ASTEPRO) 137 MCG/SPRAY nasal spray [Pharmacy Med Name: Azelastine HCl Nasal Solution 137 MCG/SPRAY] 30 mL 0     Sig: INSTILL 1 TO 2 SPRAYS BY NASAL ROUTE TWICE DAILY INTO EACH NOSTRIL AS NEEDED FOR RHINITIS       Medication is on current medication list Yes    Dosage and directions were verified? Yes    Quantity verified: 90 day supply     Pharmacy Verified?  Yes    Last Appointment:  2/17/2025    Future appts:  Future Appointments   Date Time Provider Department Center   4/7/2025  2:00 PM Natalie Barcenas MD [facility] DEP        (If no appt send self scheduling link. .REFILLAPPT)  Scheduling request sent?     [] Yes  [] No    Does patient need updated?  [] Yes  [] No

## 2025-04-07 ENCOUNTER — OFFICE VISIT (OUTPATIENT)
Dept: PRIMARY CARE CLINIC | Age: 89
End: 2025-04-07
Payer: MEDICARE

## 2025-04-07 VITALS
TEMPERATURE: 97.3 F | BODY MASS INDEX: 30.87 KG/M2 | OXYGEN SATURATION: 96 % | HEIGHT: 63 IN | DIASTOLIC BLOOD PRESSURE: 70 MMHG | HEART RATE: 96 BPM | SYSTOLIC BLOOD PRESSURE: 120 MMHG | WEIGHT: 174.2 LBS

## 2025-04-07 DIAGNOSIS — M54.16 LUMBAR RADICULOPATHY: ICD-10-CM

## 2025-04-07 DIAGNOSIS — Z00.00 MEDICARE ANNUAL WELLNESS VISIT, SUBSEQUENT: Primary | ICD-10-CM

## 2025-04-07 DIAGNOSIS — E03.9 ACQUIRED HYPOTHYROIDISM: ICD-10-CM

## 2025-04-07 DIAGNOSIS — N18.31 STAGE 3A CHRONIC KIDNEY DISEASE (HCC): ICD-10-CM

## 2025-04-07 DIAGNOSIS — E11.65 TYPE 2 DIABETES MELLITUS WITH HYPERGLYCEMIA, WITHOUT LONG-TERM CURRENT USE OF INSULIN (HCC): ICD-10-CM

## 2025-04-07 DIAGNOSIS — I48.0 PAROXYSMAL ATRIAL FIBRILLATION (HCC): ICD-10-CM

## 2025-04-07 DIAGNOSIS — E78.2 MIXED HYPERLIPIDEMIA: ICD-10-CM

## 2025-04-07 DIAGNOSIS — F51.01 PRIMARY INSOMNIA: ICD-10-CM

## 2025-04-07 PROCEDURE — G0439 PPPS, SUBSEQ VISIT: HCPCS | Performed by: INTERNAL MEDICINE

## 2025-04-07 PROCEDURE — 99214 OFFICE O/P EST MOD 30 MIN: CPT | Performed by: INTERNAL MEDICINE

## 2025-04-07 PROCEDURE — 1159F MED LIST DOCD IN RCRD: CPT | Performed by: INTERNAL MEDICINE

## 2025-04-07 PROCEDURE — 1160F RVW MEDS BY RX/DR IN RCRD: CPT | Performed by: INTERNAL MEDICINE

## 2025-04-07 PROCEDURE — 1123F ACP DISCUSS/DSCN MKR DOCD: CPT | Performed by: INTERNAL MEDICINE

## 2025-04-07 PROCEDURE — 3044F HG A1C LEVEL LT 7.0%: CPT | Performed by: INTERNAL MEDICINE

## 2025-04-07 RX ORDER — GABAPENTIN 300 MG/1
300 CAPSULE ORAL
Qty: 90 CAPSULE | Refills: 0 | Status: SHIPPED | OUTPATIENT
Start: 2025-04-07 | End: 2025-07-06

## 2025-04-07 RX ORDER — ALBUTEROL SULFATE 90 UG/1
2 INHALANT RESPIRATORY (INHALATION) 4 TIMES DAILY PRN
Qty: 18 G | Refills: 0 | Status: SHIPPED | OUTPATIENT
Start: 2025-04-07

## 2025-04-07 RX ORDER — GINSENG 100 MG
50 CAPSULE ORAL DAILY
COMMUNITY

## 2025-04-07 RX ORDER — CITALOPRAM HYDROBROMIDE 20 MG/1
20 TABLET ORAL DAILY
Qty: 90 TABLET | Refills: 0 | Status: SHIPPED | OUTPATIENT
Start: 2025-04-07

## 2025-04-07 RX ORDER — OXYBUTYNIN CHLORIDE 5 MG/1
5 TABLET, EXTENDED RELEASE ORAL DAILY
Qty: 90 TABLET | Refills: 0 | Status: SHIPPED | OUTPATIENT
Start: 2025-04-07 | End: 2025-07-06

## 2025-04-07 RX ORDER — GABAPENTIN 100 MG/1
100 CAPSULE ORAL AS NEEDED
Qty: 90 CAPSULE | Refills: 0 | Status: SHIPPED | OUTPATIENT
Start: 2025-04-07 | End: 2025-07-06

## 2025-04-07 ASSESSMENT — ENCOUNTER SYMPTOMS
NAUSEA: 0
PHOTOPHOBIA: 0
APNEA: 0
EYE ITCHING: 0
COUGH: 0
SHORTNESS OF BREATH: 0
WHEEZING: 0
ABDOMINAL DISTENTION: 0
DIARRHEA: 0
VOMITING: 0
ABDOMINAL PAIN: 0
BACK PAIN: 0
SORE THROAT: 0
TROUBLE SWALLOWING: 0
CONSTIPATION: 0
BLOOD IN STOOL: 0
SINUS PAIN: 0
EYE DISCHARGE: 0

## 2025-04-07 NOTE — ADDENDUM NOTE
Addended by: SALVADOR BECERRA on: 4/7/2025 02:27 PM     Modules accepted: Orders, Level of Service

## 2025-04-07 NOTE — PROGRESS NOTES
Yasmine Abraham (:  1934) is a 89 y.o. female,Established patient, here for evaluation of the following chief complaint(s):  Medicare AWV      Subjective   SUBJECTIVE/OBJECTIVE:  HPI:  fup    Review of Systems   Constitutional:  Negative for activity change, appetite change, fever and unexpected weight change.   HENT:  Negative for congestion, ear pain, hearing loss, sinus pain, sore throat, tinnitus and trouble swallowing.    Eyes:  Negative for photophobia, discharge, itching and visual disturbance.   Respiratory:  Negative for apnea, cough, shortness of breath and wheezing.    Cardiovascular:  Negative for chest pain, palpitations and leg swelling.   Gastrointestinal:  Negative for abdominal distention, abdominal pain, blood in stool, constipation, diarrhea, nausea and vomiting.   Endocrine: Negative for cold intolerance, polydipsia and polyuria.   Genitourinary:  Negative for difficulty urinating, dysuria, frequency and pelvic pain.   Musculoskeletal:  Negative for arthralgias, back pain, joint swelling, myalgias, neck pain and neck stiffness.   Skin:  Negative for rash and wound.   Neurological:  Negative for dizziness, tremors, syncope, light-headedness and headaches.     CBC with Differential:    Lab Results   Component Value Date/Time    WBC 4.3 2025 09:14 AM    RBC 4.74 2025 09:14 AM    HGB 13.4 2025 09:14 AM    HCT 43.8 2025 09:14 AM     2025 09:14 AM    MCV 92.4 2025 09:14 AM    MCH 28.3 2025 09:14 AM    MCHC 30.6 2025 09:14 AM    RDW 15.9 2025 09:14 AM    LYMPHOPCT 21 2025 09:14 AM    MONOPCT 11 2025 09:14 AM    EOSPCT 4 2025 09:14 AM    BASOPCT 1 2025 09:14 AM    MONOSABS 0.47 2025 09:14 AM    LYMPHSABS 0.90 2025 09:14 AM    EOSABS 0.18 2025 09:14 AM    BASOSABS 0.03 2025 09:14 AM     CMP:    Lab Results   Component Value Date/Time     2025 09:14 AM    K 4.5 2025 
MOUTH ONCE PER DAY Yes Natalie Barcenas MD   fluocinonide (LIDEX) 0.05 % external solution apply to itchy areas on scalp two times a day as needed Yes ProviderStephanie MD   Vitamin D, Cholecalciferol, 1000 units TABS Take by mouth Yes Stephanie Alvarez MD   cyanocobalamin 500 MCG tablet Take by mouth Yes ProviderStephanie MD       CareTeam (Including outside providers/suppliers regularly involved in providing care):   Patient Care Team:  Natalie Barcenas MD as PCP - General  Natalie Barcenas MD as PCP - Empaneled Provider     Recommendations for Preventive Services Due: see orders and patient instructions/AVS.  Recommended screening schedule for the next 5-10 years is provided to the patient in written form: see Patient Instructions/AVS.     Reviewed and updated this visit:  Tobacco  Allergies  Meds  Problems  Med Hx  Surg Hx  Fam Hx  Sexual   Hx

## 2025-04-24 ENCOUNTER — OFFICE VISIT (OUTPATIENT)
Dept: PAIN MANAGEMENT | Age: 89
End: 2025-04-24
Payer: MEDICARE

## 2025-04-24 VITALS
DIASTOLIC BLOOD PRESSURE: 74 MMHG | OXYGEN SATURATION: 97 % | HEIGHT: 63 IN | HEART RATE: 75 BPM | SYSTOLIC BLOOD PRESSURE: 118 MMHG | RESPIRATION RATE: 18 BRPM | WEIGHT: 174 LBS | TEMPERATURE: 96.6 F | BODY MASS INDEX: 30.83 KG/M2

## 2025-04-24 DIAGNOSIS — M47.816 LUMBAR FACET ARTHROPATHY: Primary | ICD-10-CM

## 2025-04-24 DIAGNOSIS — M51.9 LUMBAR DISC DISORDER: ICD-10-CM

## 2025-04-24 DIAGNOSIS — M47.816 LUMBAR SPONDYLOSIS: ICD-10-CM

## 2025-04-24 DIAGNOSIS — M48.061 SPINAL STENOSIS OF LUMBAR REGION WITHOUT NEUROGENIC CLAUDICATION: ICD-10-CM

## 2025-04-24 DIAGNOSIS — M16.12 PRIMARY OSTEOARTHRITIS OF LEFT HIP: ICD-10-CM

## 2025-04-24 DIAGNOSIS — M54.16 LUMBAR RADICULOPATHY: ICD-10-CM

## 2025-04-24 PROCEDURE — 1123F ACP DISCUSS/DSCN MKR DOCD: CPT | Performed by: PAIN MEDICINE

## 2025-04-24 PROCEDURE — 1160F RVW MEDS BY RX/DR IN RCRD: CPT | Performed by: PAIN MEDICINE

## 2025-04-24 PROCEDURE — 1159F MED LIST DOCD IN RCRD: CPT | Performed by: PAIN MEDICINE

## 2025-04-24 PROCEDURE — 99204 OFFICE O/P NEW MOD 45 MIN: CPT | Performed by: PAIN MEDICINE

## 2025-04-24 NOTE — PROGRESS NOTES
0           [facility]         [address], [address] [place] [address]      [phone]          Consult Note      Patient:  Yasmine Abraham, BHANU 1934    Date of Service:  25    Requesting Physician:  Natalie Barcenas MD    Reason for Consult:      Patient presents with complaints of low back pain that started a long time ago and has been progressively getting worse.  Pain is described as aching/constant.  Pain is aggravated by walking/standing. Pain is relieved by OTC pain medications. pain     HISTORY OF PRESENT ILLNESS:      Pain does radiate to Left lower extremity down to the foot. She  does not have numbness, tingling and does not have bladder or bowel dysfunction.    She has been on anticoagulation medications to include Eliquis. The patient  has not been on herbal supplements.  The patient is not diabetic.    Imaging:   Lumbar spine MRI 2018  Acute to subacute moderate to severe compression fracture at L1 without bony spinal canal compromise.  This has the imaging appearance of   a benign osteoporotic compression fracture, however a history of pathological fracture was provided.  Correlate with oncologic history.   Unchanged indeterminate marrow space focus within the L3 vertebral body.  Stability over time favors a benign process, but a myelomatous   deposit or metastatic lesion could have this imaging appearance, particularly in a patient with a known primary malignancy.  Correlation   with a nuclear medicine bone scan may be helpful for further evaluation.   Otherwise unchanged multilevel degenerative changes elsewhere within the lumbar spine.  These result in multilevel neural foraminal narrowing   without spinal canal stenosis.   Chronic compression fracture at L2 status post vertebroplasty, unchanged.     Previous treatments: Physical Therapy, Epidural Steroid Injection, Surgery L1 and L2 kyphoplasty, Bilateral L3,4,5 MBB, and medications..

## 2025-04-24 NOTE — PROGRESS NOTES
Yasmine Abraham presents to the [facility] on 4/24/2025. Yasmine is complaining of pain in her lower back that radiates to LLE. The pain is constant. The pain is described as aching and weak. Pain is rated on her best day at a 3, on her worst day at a 8, and on average at a 8 on the VAS scale. She took her last dose of Neurontin, Motrin, and Tylenol yesterday.  Pt had nerve blocks previously from [facility].    Any procedures since your last visit: No    She is  on NSAIDS and  is  on anticoagulation medications to include Eliquis and is managed by Dr. Kimani Dougherty - [facility]. (Hx of Afib)    Pacemaker or defibrillator: No     Medication Contract and Consent for Opioid Use Documents Filed        No documents found                       Resp 18   Ht 1.6 m (5' 2.99\")   Wt 78.9 kg (174 lb)   BMI 30.83 kg/m²      No LMP recorded. Patient has had a hysterectomy.

## 2025-05-01 RX ORDER — LEVOTHYROXINE SODIUM 50 UG/1
50 TABLET ORAL DAILY
Qty: 90 TABLET | Refills: 0 | Status: SHIPPED | OUTPATIENT
Start: 2025-05-01

## 2025-05-17 ENCOUNTER — HOSPITAL ENCOUNTER (OUTPATIENT)
Dept: MRI IMAGING | Age: 89
Discharge: HOME | End: 2025-05-19
Attending: PAIN MEDICINE
Payer: MEDICARE

## 2025-05-17 DIAGNOSIS — M54.16 LUMBAR RADICULOPATHY: ICD-10-CM

## 2025-05-17 PROCEDURE — 72148 MRI LUMBAR SPINE W/O DYE: CPT

## 2025-05-26 RX ORDER — ATORVASTATIN CALCIUM 20 MG/1
20 TABLET, FILM COATED ORAL NIGHTLY
Qty: 90 TABLET | Refills: 0 | Status: SHIPPED | OUTPATIENT
Start: 2025-05-26

## 2025-05-26 RX ORDER — AZELASTINE HYDROCHLORIDE 137 UG/1
2 SPRAY, METERED NASAL DAILY PRN
Qty: 30 ML | Refills: 2 | Status: SHIPPED | OUTPATIENT
Start: 2025-05-26

## 2025-07-07 NOTE — PROGRESS NOTES
Yasmine Abraham (:  1934) is a 89 y.o. female,Established patient, here for evaluation of the following chief complaint(s):  3 Month Follow-Up (Labs completed 7/10/2025)      Subjective   SUBJECTIVE/OBJECTIVE:  HPI:  Hypothyroidism:  Patient is here today to follow up chronic hypothyroidism.  This is   generally controlled on current medication regimen.  Takes medication as directed and tolerates well.  Symptoms from thyroid standpoint include none.  Most recent labs reviewed with patient and are not remarkable.  Thyroid function in particular is  controlled.     DM2:   Patient is here to fu regarding DM2. Patient is  controlled.  Taking all medications and tolerating well.  Currently on diet.    Chronic Kidney Disease:     Patient presents for follow-up of chronic renal disease last GFR was 56.  Patient was informed not to use nonsteroidals   Hyperlipidemia:  Patient is here to follow up regarding chronic hyperlipidemia.  This is  generally controlled.  Treatment includes lipitor  Patient is  compliant with lifestyle modifications.  Patient is not a smoker.  Most recent labs reviewed with patient today and are not remarkable.  Comorbid conditions include obesity.      Review of Systems   Constitutional:  Negative for activity change, appetite change, fever and unexpected weight change.   HENT:  Negative for congestion, ear pain, hearing loss, sinus pain, sore throat, tinnitus and trouble swallowing.    Eyes:  Negative for photophobia, discharge, itching and visual disturbance.   Respiratory:  Negative for apnea, cough, shortness of breath and wheezing.    Cardiovascular:  Negative for chest pain, palpitations and leg swelling.   Gastrointestinal:  Negative for abdominal distention, abdominal pain, blood in stool, constipation, diarrhea, nausea and vomiting.   Endocrine: Negative for cold intolerance, polydipsia and polyuria.   Genitourinary:  Negative for difficulty urinating, dysuria, frequency and

## 2025-07-09 RX ORDER — AZELASTINE HYDROCHLORIDE 137 UG/1
2 SPRAY, METERED NASAL DAILY PRN
Qty: 30 ML | Refills: 2 | Status: SHIPPED | OUTPATIENT
Start: 2025-07-09

## 2025-07-10 DIAGNOSIS — N18.31 STAGE 3A CHRONIC KIDNEY DISEASE (HCC): ICD-10-CM

## 2025-07-10 DIAGNOSIS — E03.9 ACQUIRED HYPOTHYROIDISM: ICD-10-CM

## 2025-07-10 DIAGNOSIS — E11.65 TYPE 2 DIABETES MELLITUS WITH HYPERGLYCEMIA, WITHOUT LONG-TERM CURRENT USE OF INSULIN (HCC): ICD-10-CM

## 2025-07-10 DIAGNOSIS — E78.2 MIXED HYPERLIPIDEMIA: ICD-10-CM

## 2025-07-10 LAB
ALBUMIN: 3.8 G/DL (ref 3.5–5.2)
ALP BLD-CCNC: 110 U/L (ref 35–104)
ALT SERPL-CCNC: 8 U/L (ref 0–35)
ANION GAP SERPL CALCULATED.3IONS-SCNC: 14 MMOL/L (ref 7–16)
AST SERPL-CCNC: 25 U/L (ref 0–35)
BASOPHILS ABSOLUTE: 0.04 K/UL (ref 0–0.2)
BASOPHILS RELATIVE PERCENT: 1 % (ref 0–2)
BILIRUB SERPL-MCNC: 0.7 MG/DL (ref 0–1.2)
BUN BLDV-MCNC: 12 MG/DL (ref 8–23)
CALCIUM SERPL-MCNC: 10.1 MG/DL (ref 8.8–10.2)
CHLORIDE BLD-SCNC: 102 MMOL/L (ref 98–107)
CHOLESTEROL, FASTING: 138 MG/DL
CO2: 26 MMOL/L (ref 22–29)
CREAT SERPL-MCNC: 1 MG/DL (ref 0.5–1)
EOSINOPHILS ABSOLUTE: 0.25 K/UL (ref 0.05–0.5)
EOSINOPHILS RELATIVE PERCENT: 5 % (ref 0–6)
GFR, ESTIMATED: 56 ML/MIN/1.73M2
GLUCOSE BLD-MCNC: 110 MG/DL (ref 74–99)
HBA1C MFR BLD: 5.9 % (ref 4–5.6)
HCT VFR BLD CALC: 42.9 % (ref 34–48)
HDLC SERPL-MCNC: 72 MG/DL
HEMOGLOBIN: 13.5 G/DL (ref 11.5–15.5)
IMMATURE GRANULOCYTES %: 0 % (ref 0–5)
IMMATURE GRANULOCYTES ABSOLUTE: <0.03 K/UL (ref 0–0.58)
LDL CHOLESTEROL: 50 MG/DL
LYMPHOCYTES ABSOLUTE: 0.94 K/UL (ref 1.5–4)
LYMPHOCYTES RELATIVE PERCENT: 18 % (ref 20–42)
MCH RBC QN AUTO: 29.3 PG (ref 26–35)
MCHC RBC AUTO-ENTMCNC: 31.5 G/DL (ref 32–34.5)
MCV RBC AUTO: 93.3 FL (ref 80–99.9)
MONOCYTES ABSOLUTE: 0.53 K/UL (ref 0.1–0.95)
MONOCYTES RELATIVE PERCENT: 10 % (ref 2–12)
NEUTROPHILS ABSOLUTE: 3.56 K/UL (ref 1.8–7.3)
NEUTROPHILS RELATIVE PERCENT: 67 % (ref 43–80)
PDW BLD-RTO: 14.4 % (ref 11.5–15)
PLATELET # BLD: 255 K/UL (ref 130–450)
PMV BLD AUTO: 10.3 FL (ref 7–12)
POTASSIUM SERPL-SCNC: 4.7 MMOL/L (ref 3.5–5.1)
RBC # BLD: 4.6 M/UL (ref 3.5–5.5)
SODIUM BLD-SCNC: 142 MMOL/L (ref 136–145)
T4 FREE: 1.3 NG/DL (ref 0.9–1.7)
TOTAL PROTEIN: 7.2 G/DL (ref 6.4–8.3)
TRIGLYCERIDE, FASTING: 82 MG/DL
TSH SERPL DL<=0.05 MIU/L-ACNC: 1.19 UIU/ML (ref 0.27–4.2)
VLDLC SERPL CALC-MCNC: 16 MG/DL
WBC # BLD: 5.3 K/UL (ref 4.5–11.5)

## 2025-07-14 ENCOUNTER — OFFICE VISIT (OUTPATIENT)
Dept: PRIMARY CARE CLINIC | Age: 89
End: 2025-07-14
Payer: MEDICARE

## 2025-07-14 VITALS
SYSTOLIC BLOOD PRESSURE: 130 MMHG | WEIGHT: 179 LBS | OXYGEN SATURATION: 95 % | HEIGHT: 63 IN | HEART RATE: 95 BPM | TEMPERATURE: 97.7 F | BODY MASS INDEX: 31.71 KG/M2 | DIASTOLIC BLOOD PRESSURE: 80 MMHG

## 2025-07-14 DIAGNOSIS — I48.0 PAROXYSMAL ATRIAL FIBRILLATION (HCC): ICD-10-CM

## 2025-07-14 DIAGNOSIS — F32.9 REACTIVE DEPRESSION: ICD-10-CM

## 2025-07-14 DIAGNOSIS — E11.65 TYPE 2 DIABETES MELLITUS WITH HYPERGLYCEMIA, WITHOUT LONG-TERM CURRENT USE OF INSULIN (HCC): ICD-10-CM

## 2025-07-14 DIAGNOSIS — N18.31 STAGE 3A CHRONIC KIDNEY DISEASE (HCC): ICD-10-CM

## 2025-07-14 DIAGNOSIS — E03.9 ACQUIRED HYPOTHYROIDISM: ICD-10-CM

## 2025-07-14 DIAGNOSIS — E78.2 MIXED HYPERLIPIDEMIA: Primary | ICD-10-CM

## 2025-07-14 DIAGNOSIS — F51.01 PRIMARY INSOMNIA: ICD-10-CM

## 2025-07-14 DIAGNOSIS — N39.3 STRESS INCONTINENCE: ICD-10-CM

## 2025-07-14 DIAGNOSIS — M54.16 LUMBAR RADICULOPATHY: ICD-10-CM

## 2025-07-14 PROCEDURE — 99214 OFFICE O/P EST MOD 30 MIN: CPT | Performed by: INTERNAL MEDICINE

## 2025-07-14 PROCEDURE — 1159F MED LIST DOCD IN RCRD: CPT | Performed by: INTERNAL MEDICINE

## 2025-07-14 PROCEDURE — 1160F RVW MEDS BY RX/DR IN RCRD: CPT | Performed by: INTERNAL MEDICINE

## 2025-07-14 PROCEDURE — 1123F ACP DISCUSS/DSCN MKR DOCD: CPT | Performed by: INTERNAL MEDICINE

## 2025-07-14 PROCEDURE — 3044F HG A1C LEVEL LT 7.0%: CPT | Performed by: INTERNAL MEDICINE

## 2025-07-14 RX ORDER — OXYBUTYNIN CHLORIDE 5 MG/1
5 TABLET, EXTENDED RELEASE ORAL DAILY
Qty: 90 TABLET | Refills: 0 | Status: SHIPPED | OUTPATIENT
Start: 2025-07-14 | End: 2025-10-12

## 2025-07-14 RX ORDER — GABAPENTIN 300 MG/1
300 CAPSULE ORAL
Qty: 90 CAPSULE | Refills: 0 | Status: SHIPPED | OUTPATIENT
Start: 2025-07-14 | End: 2025-10-12

## 2025-07-14 RX ORDER — GABAPENTIN 100 MG/1
100 CAPSULE ORAL AS NEEDED
Qty: 90 CAPSULE | Refills: 0 | Status: SHIPPED | OUTPATIENT
Start: 2025-07-14 | End: 2025-10-12

## 2025-07-14 RX ORDER — CITALOPRAM HYDROBROMIDE 20 MG/1
20 TABLET ORAL DAILY
Qty: 90 TABLET | Refills: 0 | Status: SHIPPED | OUTPATIENT
Start: 2025-07-14

## 2025-07-14 RX ORDER — AZELASTINE HYDROCHLORIDE 137 UG/1
2 SPRAY, METERED NASAL DAILY PRN
Qty: 30 ML | Refills: 2 | Status: SHIPPED | OUTPATIENT
Start: 2025-07-14

## 2025-07-14 RX ORDER — ATORVASTATIN CALCIUM 20 MG/1
20 TABLET, FILM COATED ORAL NIGHTLY
Qty: 90 TABLET | Refills: 0 | Status: SHIPPED | OUTPATIENT
Start: 2025-07-14

## 2025-08-05 RX ORDER — LEVOTHYROXINE SODIUM 50 UG/1
50 TABLET ORAL DAILY
Qty: 90 TABLET | Refills: 0 | Status: SHIPPED | OUTPATIENT
Start: 2025-08-05

## 2025-08-14 RX ORDER — FLUTICASONE PROPIONATE 50 MCG
SPRAY, SUSPENSION (ML) NASAL
Qty: 16 G | Refills: 0 | Status: SHIPPED | OUTPATIENT
Start: 2025-08-14